# Patient Record
Sex: MALE | Race: BLACK OR AFRICAN AMERICAN | NOT HISPANIC OR LATINO | URBAN - METROPOLITAN AREA
[De-identification: names, ages, dates, MRNs, and addresses within clinical notes are randomized per-mention and may not be internally consistent; named-entity substitution may affect disease eponyms.]

---

## 2018-06-15 PROBLEM — Z00.00 ENCOUNTER FOR PREVENTIVE HEALTH EXAMINATION: Status: ACTIVE | Noted: 2018-06-15

## 2021-06-07 ENCOUNTER — INPATIENT (INPATIENT)
Facility: HOSPITAL | Age: 71
LOS: 2 days | Discharge: HOME | End: 2021-06-10
Attending: INTERNAL MEDICINE | Admitting: INTERNAL MEDICINE
Payer: COMMERCIAL

## 2021-06-07 VITALS
HEART RATE: 65 BPM | SYSTOLIC BLOOD PRESSURE: 166 MMHG | OXYGEN SATURATION: 97 % | WEIGHT: 198.86 LBS | TEMPERATURE: 99 F | RESPIRATION RATE: 16 BRPM | DIASTOLIC BLOOD PRESSURE: 66 MMHG

## 2021-06-07 PROCEDURE — 99285 EMERGENCY DEPT VISIT HI MDM: CPT

## 2021-06-07 NOTE — ED ADULT TRIAGE NOTE - CHIEF COMPLAINT QUOTE
c/o midsternal chest pain starting yesterday and high blood pressure, BP as documented, denies headache, SOB, vision changes. Hx of HTN and DMT2, Fs as noted.

## 2021-06-07 NOTE — ED ADULT NURSE NOTE - OBJECTIVE STATEMENT
received pt to room 6 aox3 in no apparent distress VSS c/o chest pain since this morning. Pt endorses diaphoresis upon waking and one 1 episode n/v. Pt also states BP has been high all day 170s/100s, has not yet taken home medications. Pt denies back pain, numbness, weakness, SOB. Describes pain as tightness. Pt noted to be hypertensive, well appearing NSR on cardiac monitor awaiting MD multani will continue to monitor

## 2021-06-08 DIAGNOSIS — Z29.9 ENCOUNTER FOR PROPHYLACTIC MEASURES, UNSPECIFIED: ICD-10-CM

## 2021-06-08 DIAGNOSIS — R07.9 CHEST PAIN, UNSPECIFIED: ICD-10-CM

## 2021-06-08 DIAGNOSIS — I21.4 NON-ST ELEVATION (NSTEMI) MYOCARDIAL INFARCTION: ICD-10-CM

## 2021-06-08 DIAGNOSIS — I10 ESSENTIAL (PRIMARY) HYPERTENSION: ICD-10-CM

## 2021-06-08 DIAGNOSIS — E11.9 TYPE 2 DIABETES MELLITUS WITHOUT COMPLICATIONS: ICD-10-CM

## 2021-06-08 LAB
A1C WITH ESTIMATED AVERAGE GLUCOSE RESULT: 9.1 % — HIGH (ref 4–5.6)
ALBUMIN SERPL ELPH-MCNC: 4 G/DL — SIGNIFICANT CHANGE UP (ref 3.3–5)
ALBUMIN SERPL ELPH-MCNC: 4.1 G/DL — SIGNIFICANT CHANGE UP (ref 3.3–5)
ALP SERPL-CCNC: 75 U/L — SIGNIFICANT CHANGE UP (ref 40–120)
ALP SERPL-CCNC: 75 U/L — SIGNIFICANT CHANGE UP (ref 40–120)
ALT FLD-CCNC: 36 U/L — SIGNIFICANT CHANGE UP (ref 4–41)
ALT FLD-CCNC: 38 U/L — SIGNIFICANT CHANGE UP (ref 4–41)
ANION GAP SERPL CALC-SCNC: 10 MMOL/L — SIGNIFICANT CHANGE UP (ref 7–14)
ANION GAP SERPL CALC-SCNC: 12 MMOL/L — SIGNIFICANT CHANGE UP (ref 7–14)
APTT BLD: 28.2 SEC — SIGNIFICANT CHANGE UP (ref 27–36.3)
APTT BLD: 72.2 SEC — HIGH (ref 27–36.3)
AST SERPL-CCNC: 41 U/L — HIGH (ref 4–40)
AST SERPL-CCNC: 47 U/L — HIGH (ref 4–40)
BILIRUB SERPL-MCNC: 0.5 MG/DL — SIGNIFICANT CHANGE UP (ref 0.2–1.2)
BILIRUB SERPL-MCNC: 0.7 MG/DL — SIGNIFICANT CHANGE UP (ref 0.2–1.2)
BUN SERPL-MCNC: 13 MG/DL — SIGNIFICANT CHANGE UP (ref 7–23)
BUN SERPL-MCNC: 15 MG/DL — SIGNIFICANT CHANGE UP (ref 7–23)
CALCIUM SERPL-MCNC: 9.2 MG/DL — SIGNIFICANT CHANGE UP (ref 8.4–10.5)
CALCIUM SERPL-MCNC: 9.5 MG/DL — SIGNIFICANT CHANGE UP (ref 8.4–10.5)
CHLORIDE SERPL-SCNC: 102 MMOL/L — SIGNIFICANT CHANGE UP (ref 98–107)
CHLORIDE SERPL-SCNC: 105 MMOL/L — SIGNIFICANT CHANGE UP (ref 98–107)
CHOLEST SERPL-MCNC: 176 MG/DL — SIGNIFICANT CHANGE UP
CK MB BLD-MCNC: 2.3 % — SIGNIFICANT CHANGE UP (ref 0–2.5)
CK MB CFR SERPL CALC: 14.4 NG/ML — HIGH
CK SERPL-CCNC: 636 U/L — HIGH (ref 30–200)
CO2 SERPL-SCNC: 23 MMOL/L — SIGNIFICANT CHANGE UP (ref 22–31)
CO2 SERPL-SCNC: 26 MMOL/L — SIGNIFICANT CHANGE UP (ref 22–31)
CREAT SERPL-MCNC: 1.2 MG/DL — SIGNIFICANT CHANGE UP (ref 0.5–1.3)
CREAT SERPL-MCNC: 1.2 MG/DL — SIGNIFICANT CHANGE UP (ref 0.5–1.3)
ESTIMATED AVERAGE GLUCOSE: 214 MG/DL — HIGH (ref 68–114)
GLUCOSE BLDC GLUCOMTR-MCNC: 112 MG/DL — HIGH (ref 70–99)
GLUCOSE BLDC GLUCOMTR-MCNC: 168 MG/DL — HIGH (ref 70–99)
GLUCOSE BLDC GLUCOMTR-MCNC: 188 MG/DL — HIGH (ref 70–99)
GLUCOSE BLDC GLUCOMTR-MCNC: 190 MG/DL — HIGH (ref 70–99)
GLUCOSE SERPL-MCNC: 207 MG/DL — HIGH (ref 70–99)
GLUCOSE SERPL-MCNC: 226 MG/DL — HIGH (ref 70–99)
HCT VFR BLD CALC: 38 % — LOW (ref 39–50)
HCT VFR BLD CALC: 39.2 % — SIGNIFICANT CHANGE UP (ref 39–50)
HDLC SERPL-MCNC: 51 MG/DL — SIGNIFICANT CHANGE UP
HGB BLD-MCNC: 12.3 G/DL — LOW (ref 13–17)
HGB BLD-MCNC: 12.8 G/DL — LOW (ref 13–17)
INR BLD: 1.11 RATIO — SIGNIFICANT CHANGE UP (ref 0.88–1.16)
LIPID PNL WITH DIRECT LDL SERPL: 106 MG/DL — HIGH
MAGNESIUM SERPL-MCNC: 2 MG/DL — SIGNIFICANT CHANGE UP (ref 1.6–2.6)
MCHC RBC-ENTMCNC: 30.4 PG — SIGNIFICANT CHANGE UP (ref 27–34)
MCHC RBC-ENTMCNC: 30.5 PG — SIGNIFICANT CHANGE UP (ref 27–34)
MCHC RBC-ENTMCNC: 32.4 GM/DL — SIGNIFICANT CHANGE UP (ref 32–36)
MCHC RBC-ENTMCNC: 32.7 GM/DL — SIGNIFICANT CHANGE UP (ref 32–36)
MCV RBC AUTO: 93.1 FL — SIGNIFICANT CHANGE UP (ref 80–100)
MCV RBC AUTO: 94.3 FL — SIGNIFICANT CHANGE UP (ref 80–100)
NON HDL CHOLESTEROL: 125 MG/DL — SIGNIFICANT CHANGE UP
NRBC # BLD: 0 /100 WBCS — SIGNIFICANT CHANGE UP
NRBC # BLD: 0 /100 WBCS — SIGNIFICANT CHANGE UP
NRBC # FLD: 0 K/UL — SIGNIFICANT CHANGE UP
NRBC # FLD: 0 K/UL — SIGNIFICANT CHANGE UP
NT-PROBNP SERPL-SCNC: 156 PG/ML — SIGNIFICANT CHANGE UP
PHOSPHATE SERPL-MCNC: 2.3 MG/DL — LOW (ref 2.5–4.5)
PLATELET # BLD AUTO: 132 K/UL — LOW (ref 150–400)
PLATELET # BLD AUTO: 133 K/UL — LOW (ref 150–400)
POTASSIUM SERPL-MCNC: 4 MMOL/L — SIGNIFICANT CHANGE UP (ref 3.5–5.3)
POTASSIUM SERPL-MCNC: 4.1 MMOL/L — SIGNIFICANT CHANGE UP (ref 3.5–5.3)
POTASSIUM SERPL-SCNC: 4 MMOL/L — SIGNIFICANT CHANGE UP (ref 3.5–5.3)
POTASSIUM SERPL-SCNC: 4.1 MMOL/L — SIGNIFICANT CHANGE UP (ref 3.5–5.3)
PROT SERPL-MCNC: 6.8 G/DL — SIGNIFICANT CHANGE UP (ref 6–8.3)
PROT SERPL-MCNC: 7 G/DL — SIGNIFICANT CHANGE UP (ref 6–8.3)
PROTHROM AB SERPL-ACNC: 12.6 SEC — SIGNIFICANT CHANGE UP (ref 10.6–13.6)
RBC # BLD: 4.03 M/UL — LOW (ref 4.2–5.8)
RBC # BLD: 4.21 M/UL — SIGNIFICANT CHANGE UP (ref 4.2–5.8)
RBC # FLD: 12.2 % — SIGNIFICANT CHANGE UP (ref 10.3–14.5)
RBC # FLD: 12.6 % — SIGNIFICANT CHANGE UP (ref 10.3–14.5)
SARS-COV-2 RNA SPEC QL NAA+PROBE: SIGNIFICANT CHANGE UP
SODIUM SERPL-SCNC: 137 MMOL/L — SIGNIFICANT CHANGE UP (ref 135–145)
SODIUM SERPL-SCNC: 141 MMOL/L — SIGNIFICANT CHANGE UP (ref 135–145)
TRIGL SERPL-MCNC: 93 MG/DL — SIGNIFICANT CHANGE UP
TROPONIN T, HIGH SENSITIVITY RESULT: 422 NG/L — CRITICAL HIGH
TROPONIN T, HIGH SENSITIVITY RESULT: 465 NG/L — CRITICAL HIGH
TROPONIN T, HIGH SENSITIVITY RESULT: 562 NG/L — CRITICAL HIGH
TSH SERPL-MCNC: 0.66 UIU/ML — SIGNIFICANT CHANGE UP (ref 0.27–4.2)
WBC # BLD: 5.23 K/UL — SIGNIFICANT CHANGE UP (ref 3.8–10.5)
WBC # BLD: 5.99 K/UL — SIGNIFICANT CHANGE UP (ref 3.8–10.5)
WBC # FLD AUTO: 5.23 K/UL — SIGNIFICANT CHANGE UP (ref 3.8–10.5)
WBC # FLD AUTO: 5.99 K/UL — SIGNIFICANT CHANGE UP (ref 3.8–10.5)

## 2021-06-08 PROCEDURE — 92941 PRQ TRLML REVSC TOT OCCL AMI: CPT | Mod: RC

## 2021-06-08 PROCEDURE — 99223 1ST HOSP IP/OBS HIGH 75: CPT

## 2021-06-08 PROCEDURE — 71045 X-RAY EXAM CHEST 1 VIEW: CPT | Mod: 26

## 2021-06-08 PROCEDURE — 93458 L HRT ARTERY/VENTRICLE ANGIO: CPT | Mod: 26,59

## 2021-06-08 RX ORDER — NITROGLYCERIN 6.5 MG
0.4 CAPSULE, EXTENDED RELEASE ORAL
Refills: 0 | Status: DISCONTINUED | OUTPATIENT
Start: 2021-06-08 | End: 2021-06-10

## 2021-06-08 RX ORDER — ASPIRIN/CALCIUM CARB/MAGNESIUM 324 MG
243 TABLET ORAL ONCE
Refills: 0 | Status: COMPLETED | OUTPATIENT
Start: 2021-06-08 | End: 2021-06-08

## 2021-06-08 RX ORDER — ATORVASTATIN CALCIUM 80 MG/1
80 TABLET, FILM COATED ORAL AT BEDTIME
Refills: 0 | Status: DISCONTINUED | OUTPATIENT
Start: 2021-06-09 | End: 2021-06-10

## 2021-06-08 RX ORDER — DEXTROSE 50 % IN WATER 50 %
12.5 SYRINGE (ML) INTRAVENOUS ONCE
Refills: 0 | Status: DISCONTINUED | OUTPATIENT
Start: 2021-06-08 | End: 2021-06-10

## 2021-06-08 RX ORDER — METOPROLOL TARTRATE 50 MG
25 TABLET ORAL
Refills: 0 | Status: DISCONTINUED | OUTPATIENT
Start: 2021-06-08 | End: 2021-06-08

## 2021-06-08 RX ORDER — SODIUM CHLORIDE 9 MG/ML
1000 INJECTION INTRAMUSCULAR; INTRAVENOUS; SUBCUTANEOUS
Refills: 0 | Status: DISCONTINUED | OUTPATIENT
Start: 2021-06-08 | End: 2021-06-10

## 2021-06-08 RX ORDER — DEXTROSE 50 % IN WATER 50 %
15 SYRINGE (ML) INTRAVENOUS ONCE
Refills: 0 | Status: DISCONTINUED | OUTPATIENT
Start: 2021-06-08 | End: 2021-06-10

## 2021-06-08 RX ORDER — GLUCAGON INJECTION, SOLUTION 0.5 MG/.1ML
1 INJECTION, SOLUTION SUBCUTANEOUS ONCE
Refills: 0 | Status: DISCONTINUED | OUTPATIENT
Start: 2021-06-08 | End: 2021-06-10

## 2021-06-08 RX ORDER — DEXTROSE 50 % IN WATER 50 %
25 SYRINGE (ML) INTRAVENOUS ONCE
Refills: 0 | Status: DISCONTINUED | OUTPATIENT
Start: 2021-06-08 | End: 2021-06-10

## 2021-06-08 RX ORDER — HEPARIN SODIUM 5000 [USP'U]/ML
5000 INJECTION INTRAVENOUS; SUBCUTANEOUS ONCE
Refills: 0 | Status: COMPLETED | OUTPATIENT
Start: 2021-06-08 | End: 2021-06-08

## 2021-06-08 RX ORDER — HYDRALAZINE HCL 50 MG
25 TABLET ORAL EVERY 8 HOURS
Refills: 0 | Status: DISCONTINUED | OUTPATIENT
Start: 2021-06-08 | End: 2021-06-09

## 2021-06-08 RX ORDER — ATORVASTATIN CALCIUM 80 MG/1
80 TABLET, FILM COATED ORAL ONCE
Refills: 0 | Status: COMPLETED | OUTPATIENT
Start: 2021-06-08 | End: 2021-06-08

## 2021-06-08 RX ORDER — CLOPIDOGREL BISULFATE 75 MG/1
75 TABLET, FILM COATED ORAL DAILY
Refills: 0 | Status: DISCONTINUED | OUTPATIENT
Start: 2021-06-09 | End: 2021-06-10

## 2021-06-08 RX ORDER — HEPARIN SODIUM 5000 [USP'U]/ML
INJECTION INTRAVENOUS; SUBCUTANEOUS
Qty: 25000 | Refills: 0 | Status: DISCONTINUED | OUTPATIENT
Start: 2021-06-08 | End: 2021-06-08

## 2021-06-08 RX ORDER — SODIUM,POTASSIUM PHOSPHATES 278-250MG
1 POWDER IN PACKET (EA) ORAL ONCE
Refills: 0 | Status: COMPLETED | OUTPATIENT
Start: 2021-06-08 | End: 2021-06-08

## 2021-06-08 RX ORDER — SODIUM CHLORIDE 9 MG/ML
1000 INJECTION, SOLUTION INTRAVENOUS
Refills: 0 | Status: DISCONTINUED | OUTPATIENT
Start: 2021-06-08 | End: 2021-06-10

## 2021-06-08 RX ORDER — ATORVASTATIN CALCIUM 80 MG/1
80 TABLET, FILM COATED ORAL AT BEDTIME
Refills: 0 | Status: DISCONTINUED | OUTPATIENT
Start: 2021-06-08 | End: 2021-06-08

## 2021-06-08 RX ORDER — ASPIRIN/CALCIUM CARB/MAGNESIUM 324 MG
81 TABLET ORAL DAILY
Refills: 0 | Status: DISCONTINUED | OUTPATIENT
Start: 2021-06-09 | End: 2021-06-10

## 2021-06-08 RX ORDER — INSULIN LISPRO 100/ML
VIAL (ML) SUBCUTANEOUS EVERY 6 HOURS
Refills: 0 | Status: DISCONTINUED | OUTPATIENT
Start: 2021-06-08 | End: 2021-06-08

## 2021-06-08 RX ORDER — HEPARIN SODIUM 5000 [USP'U]/ML
5300 INJECTION INTRAVENOUS; SUBCUTANEOUS EVERY 6 HOURS
Refills: 0 | Status: DISCONTINUED | OUTPATIENT
Start: 2021-06-08 | End: 2021-06-08

## 2021-06-08 RX ORDER — CLOPIDOGREL BISULFATE 75 MG/1
600 TABLET, FILM COATED ORAL ONCE
Refills: 0 | Status: COMPLETED | OUTPATIENT
Start: 2021-06-08 | End: 2021-06-08

## 2021-06-08 RX ORDER — INSULIN LISPRO 100/ML
VIAL (ML) SUBCUTANEOUS
Refills: 0 | Status: DISCONTINUED | OUTPATIENT
Start: 2021-06-08 | End: 2021-06-10

## 2021-06-08 RX ADMIN — HEPARIN SODIUM 1000 UNIT(S)/HR: 5000 INJECTION INTRAVENOUS; SUBCUTANEOUS at 02:55

## 2021-06-08 RX ADMIN — Medication 25 MILLIGRAM(S): at 09:15

## 2021-06-08 RX ADMIN — HEPARIN SODIUM 5000 UNIT(S): 5000 INJECTION INTRAVENOUS; SUBCUTANEOUS at 02:55

## 2021-06-08 RX ADMIN — Medication 1: at 18:29

## 2021-06-08 RX ADMIN — SODIUM CHLORIDE 75 MILLILITER(S): 9 INJECTION INTRAMUSCULAR; INTRAVENOUS; SUBCUTANEOUS at 14:22

## 2021-06-08 RX ADMIN — CLOPIDOGREL BISULFATE 600 MILLIGRAM(S): 75 TABLET, FILM COATED ORAL at 04:20

## 2021-06-08 RX ADMIN — HEPARIN SODIUM 1000 UNIT(S)/HR: 5000 INJECTION INTRAVENOUS; SUBCUTANEOUS at 10:03

## 2021-06-08 RX ADMIN — Medication 1 PACKET(S): at 15:02

## 2021-06-08 RX ADMIN — Medication 25 MILLIGRAM(S): at 18:29

## 2021-06-08 RX ADMIN — ATORVASTATIN CALCIUM 80 MILLIGRAM(S): 80 TABLET, FILM COATED ORAL at 09:17

## 2021-06-08 RX ADMIN — Medication 243 MILLIGRAM(S): at 04:20

## 2021-06-08 NOTE — H&P ADULT - PROBLEM SELECTOR PLAN 4
- DVT ppx - on heparin gtt for ACS  - Case and plan to be discussed with attending - DVT ppx - on heparin gtt for ACS  - Case and plan to be discussed with Dr. Coronel

## 2021-06-08 NOTE — H&P ADULT - PROBLEM SELECTOR PLAN 3
- Check HgA1c  - hold metformin while inpatient  - check FS q6h while NPO  - ISS ordered for coverage

## 2021-06-08 NOTE — H&P ADULT - NSICDXFAMILYHX_GEN_ALL_CORE_FT
FAMILY HISTORY:  Mother  Still living? Unknown  Maternal family history of hypertension, Age at diagnosis: Age Unknown

## 2021-06-08 NOTE — ED PROVIDER NOTE - CLINICAL SUMMARY MEDICAL DECISION MAKING FREE TEXT BOX
71 M with a pmhx of HTN, DM presents to the ED with chest pain that started acutely this morning. Since this morning, he has had multiple episode of chest pain since this morning. He has no identifiable triggers for his chest pain. vitals stable. PE is obese male, no acute distress. rest of exam is benign. r/o acs. has multiple risk factors. will order labs, imaging, reasess

## 2021-06-08 NOTE — H&P ADULT - NEGATIVE NEUROLOGICAL SYMPTOMS
no transient paralysis/no weakness/no paresthesias/no generalized seizures/no syncope/no loss of sensation/no difficulty walking/no headache

## 2021-06-08 NOTE — CONSULT NOTE ADULT - PROBLEM SELECTOR RECOMMENDATION 3
Hold Metformin for possible Angiogram today  Consider Insulin sliding scale with AC and HS glucose checks  HgA1C

## 2021-06-08 NOTE — CONSULT NOTE ADULT - PROBLEM SELECTOR RECOMMENDATION 9
Continue Heparin gtt ACS Protocol  Plavix 600mg orally Stat  Nitroglycerin SL q5m X 3 for chest pain  Change home dose Metoprolol ER 25mg to Metoprolol Tartrate 25mg BID  Continue Troponins, CK, CK-MB q6-8hrs X 3, Lipid Panel,   EKG q8h and PRN for chest pain  NPO for possible Angiogram 6/8  TTE to evaluate left ventricular function today Continue Heparin gtt ACS Protocol  Plavix 600mg orally Stat  Nitroglycerin SL q5m X 3 for chest pain  Change home dose Metoprolol ER 25mg to Metoprolol Tartrate 25mg BID  Continue Troponins, CK, CK-MB q6-8hrs X 3, Lipid Panel,   EKG q8h and PRN for chest pain  NPO for possible Angiogram 6/8  TTE to evaluate left ventricular function today  Please obtain medical records from Dr. Spaulding, [phone]

## 2021-06-08 NOTE — H&P ADULT - NSHPLABSRESULTS_GEN_ALL_CORE
12.3   5.99  )-----------( 133      ( 08 Jun 2021 01:08 )             38.0   06-08    137  |  102  |  15  ----------------------------<  226<H>  4.0   |  23  |  1.20    Ca    9.5      08 Jun 2021 01:08    TPro  7.0  /  Alb  4.0  /  TBili  0.5  /  DBili  x   /  AST  47<H>  /  ALT  38  /  AlkPhos  75  06-08    TropT 465->562    CKMB 21.7  ProBNP 156    Covid PCR neg    CXR no acute findings    ED EKG #1: NSR @ 68bpm, no ischemic changes, no prior EKG in system to compare   ED EKG #2: NSR @ 61bpm, no acute ischemic changes, unchanged from first

## 2021-06-08 NOTE — ED ADULT NURSE REASSESSMENT NOTE - NS ED NURSE REASSESS COMMENT FT1
heparin infusion initiated, pt NSR on cardiac monitor, 2 large bore IVs in placed. Pt educated on signs/symptoms of bleeding. Infusion and bolus verified by 2nd RN bedside. Will continue to monitor

## 2021-06-08 NOTE — H&P ADULT - PROBLEM SELECTOR PLAN 2
- Uncontrolled in setting of missing home medications (last doses were 6/6 per patient)  - Per my discussion with cards, hold olmesartan/HCTZ with plan for angiogram  - start on hydralazine 25mg PO TID with hold parameters as above  - changed metoprolol succinate 25mg daily to Lopressor 25mg PO BID as above   - Monitor vital signs q4h

## 2021-06-08 NOTE — H&P ADULT - ATTENDING COMMENTS
Pt seen in CDU at 9:45AM, sitting in bed, denies chest pain since admission. Pending [facility] today  Overall, 71M w/T2DM, HTN admitted with chest pain found to have NSTEMI, pending cath today.  #CP: s/p ASA & Plavix load, on hep gtt, pending cath today  Trops/Ekg reviewed   f/u A1c, lipid, TSH   #T2DM: hold metformin, check A1c, c/w ISS.  #HTN: uncontrolled, BP above goal, hold ACE/Arb, c/w hydral 25mg TID for now, uptitrate as needed  d/w ACP Pt seen in CDU at 9:45AM, sitting in bed, denies chest pain since admission. Pending [facility] today  Overall, 71M w/T2DM, HTN admitted with chest pain found to have NSTEMI, pending cath today.  #CP: s/p ASA & Plavix load, on hep gtt, pending cath today  Trops/Ekg reviewed   f/u A1c, lipid, TSH   #T2DM: hold metformin, check A1c, c/w ISS.  #HTN: uncontrolled, BP above goal, hold ACE/Arb, c/w hydral 25mg TID for now, uptitrate as needed  d/w ACP  Called PCP again, no answer, unable to leave message

## 2021-06-08 NOTE — PROGRESS NOTE ADULT - ASSESSMENT
70 yo M with HTN, HLD, T2DM p/w retrosternal cp from PMD office yesterday. Uptrending troponin and CKMB.    Bedside POCUS with inferoseptum hypokinesis. Severe LV hypertrophy and calcified aortic valve     -ASA/Plavix load and heparin gtt   -would hold beta blocker given possible inferior wall involvement   -hydalazine for bp control   -hold ARB and ACE-i pre cath   -cont high intensity statin   -f/u formal TTE   -for [facility] today

## 2021-06-08 NOTE — H&P ADULT - NSHPSOCIALHISTORY_GEN_ALL_CORE
Lives at home with wife, independent of ADLs    Admits occasional alcohol usage  denies drugs or tobacco    Works as  for [employer]

## 2021-06-08 NOTE — H&P ADULT - PROBLEM SELECTOR PLAN 1
- Elevated Trops 465->562, ck 859, CKMB 21.7 consistent with NSTEMI  - EKG x 2 NSR with no acute ischemic changes noted   - Cardiology evaluation appreciated  - Keep NPO for possible angiogram today  - s/p plavix 600mg in ED - cont plavix 75mg daily thereafter   - s/p  in ED - cont asa 81mg daily thereafter   - changed metoprolol succinate ER 25mg daily to Lopressor 25mg BID per cards  - BP control - hold ARB per my discussion with cards fellow this AM - start hydralazine 25mg PO TID   - Serial EKG q8h, trend cardiac enzymes q8h x 3 sets  - check lipid panel, started on atorvastatin 80mg HS per cards   - Nitro SL q5min x 3 doses PRN for chest pain if needed  - TTE to evaluate left ventricular function  - Keep O2% > 90% with O2 via nasal cannula if required   - Keep K > 4, Mg > 2  - attempted to call Dr. Ramirez office at [phone] today to request records however office closed currently

## 2021-06-08 NOTE — H&P ADULT - NEGATIVE ENMT SYMPTOMS
no hearing difficulty/no ear pain/no tinnitus/no vertigo/no nasal congestion/no nasal discharge/no throat pain

## 2021-06-08 NOTE — CONSULT NOTE ADULT - SUBJECTIVE AND OBJECTIVE BOX
This is a 71 year old man with past medical history of Type 2 Diabetes mellitus, Hypertension presented to [facility] ED for intermittent chest pain since 6/7/21 3:30 am while preparing to go to work.  Chest pain described as sharp pain, non-radiating pain localized to midsternum that worsens with activity and resolves with rest.  He contacted his private doctor, Dr. Spaulding in [place], [place], who instructed him to take aspirin 81 mg and come to the ED.  In ED EKG was NSR with no ischemic changes, resulting Cardiac Enzymes show Troponin 465 uptrending to 562,  with CK-MB 21.7.  Patient seen resting comfortably in bed on Heparin gtt ACS protocol, states he is currently not having chest pain and has not had chest pain since 8pm. Patietn on telemetry showing SR 60-70 with no ectopy, BPs 160s/70s to 179/89. Patient denies recent stressors, previous chest pain, fever, cough, chills N/V/D.      This is a 71 year old man with past medical history of Type 2 Diabetes mellitus, Hypertension presented to [facility] ED for intermittent chest pain since 6/7/21 3:30 am while preparing to go to work.  Chest pain described as sharp pain, non-radiating pain localized to midsternum that worsens with activity and resolves with rest.  He contacted his private doctor, Dr. Spaulding in [place], [place], who instructed him to take aspirin 81 mg and come to the ED.  In ED EKG was NSR with no ischemic changes, resulting Cardiac Enzymes show Troponin 465 uptrending to 562,  with CK-MB 21.7.  Patient seen resting comfortably in bed on Heparin gtt ACS protocol, states he is currently not having chest pain and has not had chest pain since 8pm. Patietn on telemetry showing SR 60-70 with no ectopy, BPs 160s/70s to 179/89. Patient denies recent stressors, previous chest pain, fever, cough, chills N/V/D.      PHYSICAL EXAM:      Constitutional: Resting Comfortably, Alert    Eyes: PERRL    ENMT: normal oral mucosa    Neck: No JVD, No cervical lymphadenopathy    Respiratory: Clear in all fields    Cardiovascular: S1S2 RRR, no chest pain    Gastrointestinal: mild distension, soft, no organomegaly    Extremities: No lower extremity peripheral edema    Vascular: +2 pedal pulses    Neurological: A+OX3      Home medications:  Olmesartan/HCTZ 40mg/12.5mg daily  Metoprolol ER 25mg daily  Metformin 500mg BID    Patient denies ETOH abuse/Smoking  Family History of Hypertension - Mother        This is a 71 year old man with past medical history of Type 2 Diabetes mellitus, Hypertension presented to [facility] ED for intermittent chest pain since 6/7/21 3:30 am while preparing to go to work.  Chest pain described as sharp pain, non-radiating pain localized to midsternum that worsens with activity and resolves with rest.  He contacted his private doctor, Dr. Spaulding in [place], [place], who instructed him to take aspirin 81 mg and come to the ED.  In ED EKG was NSR with no ischemic changes, resulting Cardiac Enzymes show Troponin 465 uptrending to 562,  with CK-MB 21.7.  Patient seen resting comfortably in bed on Heparin gtt ACS protocol, states he is currently not having chest pain and has not had chest pain since 8pm. Patietn on telemetry showing SR 60-70 with no ectopy, BPs 160s/70s to 179/89. Patient denies recent stressors, previous chest pain, fever, cough, chills N/V/D.      PHYSICAL EXAM:      Constitutional: Resting Comfortably, Alert    Eyes: PERRL    ENMT: normal oral mucosa    Neck: No JVD, No cervical lymphadenopathy    Respiratory: Clear in all fields    Cardiovascular: S1S2 RRR, no chest pain    Gastrointestinal: mild distension, soft, no organomegaly    Extremities: No lower extremity peripheral edema    Vascular: +2 pedal pulses    Neurological: A+OX3      Home medications:  Olmesartan/HCTZ 40mg/12.5mg daily  Metoprolol ER 25mg daily  Metformin 500mg BID    Patient denies ETOH abuse/Smoking  Family History of Hypertension - Mother            This is a 71 year old man with past medical history of Type 2 Diabetes mellitus, Hypertension presented to [facility] ED for intermittent chest pain since 6/7/21 3:30 am while preparing to go to work.  Chest pain described as sharp pain, non-radiating pain localized to midsternum that worsens with activity and resolves with rest.  He contacted his private doctor, Dr. Spaulding in [place], [place], who instructed him to take aspirin 81 mg and come to the ED.  In ED EKG was NSR with no ischemic changes, resulting Cardiac Enzymes show Troponin 465 uptrending to 562,  with CK-MB 21.7.  Patient seen resting comfortably in bed on Heparin gtt ACS protocol, states he is currently not having chest pain and has not had chest pain since 8pm. Patietn on telemetry showing SR 60-70 with no ectopy, BPs 160s/70s to 179/89. Patient denies recent stressors, previous chest pain, fever, cough, chills N/V/D.      PHYSICAL EXAM:      Constitutional: Resting Comfortably, Alert    Eyes: PERRL    ENMT: normal oral mucosa    Neck: No JVD, No cervical lymphadenopathy    Respiratory: Clear in all fields    Cardiovascular: S1S2 RRR, no chest pain    Gastrointestinal: mild distension, soft, no organomegaly    Extremities: No lower extremity peripheral edema    Vascular: +2 pedal pulses    Neurological: A+OX3      Home medications:  Olmesartan/HCTZ 40mg/12.5mg daily  Metoprolol ER 25mg daily  Metformin 500mg BID    Patient denies ETOH abuse/Smoking  Family History of Hypertension - Mother              This is a 71 year old man with past medical history of Type 2 Diabetes mellitus, Hypertension presented to [facility] ED for intermittent chest pain since 6/7/21 3:30 am while preparing to go to work.  Chest pain described as sharp pain, non-radiating pain localized to midsternum that worsens with activity and resolves with rest.  He contacted his private doctor, Dr. Spaulding in [place], [place], who instructed him to take aspirin 81 mg and come to the ED.  In ED EKG was NSR with no ischemic changes, resulting Cardiac Enzymes show Troponin 465 uptrending to 562,  with CK-MB 21.7.  Patient seen resting comfortably in bed on Heparin gtt ACS protocol, states he is currently not having chest pain and has not had chest pain since 8pm. Patietn on telemetry showing SR 60-70 with no ectopy, BPs 160s/70s to 179/89. Patient denies recent stressors, previous chest pain, fever, cough, chills N/V/D.      PHYSICAL EXAM:      Constitutional: Resting Comfortably, Alert    Eyes: PERRL    ENMT: normal oral mucosa    Neck: No JVD, No cervical lymphadenopathy    Respiratory: Clear in all fields    Cardiovascular: S1S2 RRR, no chest pain    Gastrointestinal: mild distension, soft, no organomegaly    Extremities: No lower extremity peripheral edema    Vascular: +2 pedal pulses    Neurological: A+OX3      Home medications:  Olmesartan/HCTZ 40mg/12.5mg daily  Metoprolol ER 25mg daily  Metformin 500mg BID    Patient denies ETOH abuse/Smoking  Family History of Hypertension - Mother    HISTORY OF PRESENT ILLNESS:      Allergies    No Known Allergies    Intolerances	    MEDICATIONS:  heparin   Injectable 5300 Unit(s) IV Push every 6 hours PRN  heparin  Infusion.  Unit(s)/Hr IV Continuous <Continuous>    PAST MEDICAL & SURGICAL HISTORY:  Hypertension, unspecified type      EVIEW OF SYSTEMS:  See HPI, otherwise complete 10 point review of systems negative    PHYSICAL EXAM:  T(C): 37.2 (06-08-21 @ 02:22), Max: 37.3 (06-07-21 @ 21:59)  HR: 60 (06-08-21 @ 02:22) (60 - 75)  BP: 170/87 (06-08-21 @ 02:22) (166/66 - 194/88)  RR: 16 (06-08-21 @ 02:22) (16 - 18)  SpO2: 100% (06-08-21 @ 02:22) (97% - 100%)  Wt(kg): --  I&O's Summary    LABS:	 	    CBC Full  -  ( 08 Jun 2021 01:08 )  WBC Count : 5.99 K/uL  Hemoglobin : 12.3 g/dL  Hematocrit : 38.0 %  Platelet Count - Automated : 133 K/uL  Mean Cell Volume : 94.3 fL  Mean Cell Hemoglobin : 30.5 pg  Mean Cell Hemoglobin Concentration : 32.4 gm/dL  Auto Neutrophil # : x  Auto Lymphocyte # : x  Auto Monocyte # : x  Auto Eosinophil # : x  Auto Basophil # : x  Auto Neutrophil % : x  Auto Lymphocyte % : x  Auto Monocyte % : x  Auto Eosinophil % : x  Auto Basophil % : x    06-08    137  |  102  |  15  ----------------------------<  226<H>  4.0   |  23  |  1.20    Ca    9.5      08 Jun 2021 01:08    TPro  7.0  /  Alb  4.0  /  TBili  0.5  /  DBili  x   /  AST  47<H>  /  ALT  38  /  AlkPhos  75  06-08      proBNP: Serum Pro-Brain Natriuretic Peptide: 156 pg/mL (06-08 @ 01:08)    Lipid Profile:   HgA1c:   TSH:       CARDIAC MARKERS:  Trop 465 -> 562    CK-MB 21.7            TELEMETRY: 	SR 60-70s    ECG:  	NSR  RADIOLOGY: Chest X-Ray No pulmonary edema or cardiomegaly  OTHER:

## 2021-06-08 NOTE — CONSULT NOTE ADULT - PROBLEM SELECTOR RECOMMENDATION 2
May give Olmesartan 40mg X 1 dose for BP control May give Olmesartan 40mg X 1 dose now for BP control  Consider standing oral Hydralazine 25mg q6h until post angiogram - hold for SBP < 100mmHg

## 2021-06-08 NOTE — H&P ADULT - NEGATIVE GASTROINTESTINAL SYMPTOMS
no nausea/no diarrhea/no constipation/no change in bowel habits/no abdominal pain/no melena/no hematochezia

## 2021-06-08 NOTE — ED PROVIDER NOTE - PHYSICAL EXAMINATION
GENERAL: no acute distress, non-toxic appearing  HEAD: normocephalic, atraumatic  HEENT: normal conjunctiva, oral mucosa moist, neck supple  CARDIAC: regular rate and rhythm, normal S1 and S2,  no appreciable murmurs  PULM: clear to ascultation bilaterally, no crackles, rales, rhonchi, or wheezing  GI: abdomen nondistended, soft, nontender, no guarding or rebound tenderness  : no CVA tenderness, no suprapubic tenderness  NEURO: alert and oriented x 3, normal speech, PERRLA, EOMI, no focal motor or sensory deficits, nonantalgic gait  MSK: no visible deformities, no peripheral edema, calf tenderness/redness/swelling  SKIN: no visible rashes, dry, well-perfused  PSYCH: appropriate mood and affect

## 2021-06-08 NOTE — H&P ADULT - HISTORY OF PRESENT ILLNESS
71M PMHx DM, HTN admitted with chest pain. Patient states he was in his usual state of health when around 3:30AM on 6/7 he was getting ready for work and started to have chest pain. He describes the chest pain as 5/10 on pain scale, no alleviating or aggravating factors, sharp, non-radiating, midsternal location. He admits associated diaphoresis. He denies any other associated symptoms such as shortness of breath, palpitations, nausea, headache. His wife gave him allen tea with garlic and he did vomit that up but does not feel the vomiting was related to his chest pain. He states pain comes and goes without clear triggers and has been chest pain free since arrival to the ER. He is resting comfortably currently in no acute distress and is currently asymptomatic. He does not follow with cardiologist in the outpatient setting. He otherwise denies any recurrent chest pain, abdominal pain, changes in bowel habits, constipation, diarrhea, changes in vision or hearing, focal weakness or problems with urination. In the ED he was started on heparin gtt, given plavix 600mg and ASA 262mg and evaluate by cardiology. He was also noted to be hypertensive with uncontrolled BP. EKG was NSR without ischemic changes x 2.

## 2021-06-08 NOTE — CONSULT NOTE ADULT - ATTENDING COMMENTS
Personally saw and examined patient  Labs and vitals reviewed  Agree with above assessment and plan  71M with DM and HTN p/w CP, NSTEMI  s/p PCI RCA, now feeling well, denies cp  cont DAPT, statin  will plan to start BB therapy  TTE pending  will follow

## 2021-06-08 NOTE — H&P ADULT - MUSCULOSKELETAL
normal/ROM intact/no joint swelling/no joint erythema/no joint warmth/normal strength details… detailed exam

## 2021-06-08 NOTE — ED ADULT NURSE REASSESSMENT NOTE - NS ED NURSE REASSESS COMMENT FT1
pt noted with elevated trops. 2nd PIV 18 G L AC placed, labs sent, rpt EKG completed, NSR on cardiac monitor will initiate heparin infusion

## 2021-06-08 NOTE — ED PROVIDER NOTE - OBJECTIVE STATEMENT
71 M with a pmhx of HTN, DM presents to the ED with chest pain that started acutely this morning. Since this morning, he has had multiple episode of chest pain since this morning. He has no identifiable triggers for his chest pain. He does not follow with a cardiologist. He chest pain is localized over his anterior chest and does not radiate anywhere else. He denies any fevers, chills, N/V/D. He denies any other symptoms at bedside.

## 2021-06-08 NOTE — ED ADULT NURSE REASSESSMENT NOTE - NS ED NURSE REASSESS COMMENT FT1
CANDICE dong to transport pt to CDU, report given to Nicola pt transported denying complaints VSS, NSR on cardiac monitor, AOX3, in no apparent distress

## 2021-06-09 ENCOUNTER — TRANSCRIPTION ENCOUNTER (OUTPATIENT)
Age: 71
End: 2021-06-09

## 2021-06-09 DIAGNOSIS — E11.65 TYPE 2 DIABETES MELLITUS WITH HYPERGLYCEMIA: ICD-10-CM

## 2021-06-09 DIAGNOSIS — I10 ESSENTIAL (PRIMARY) HYPERTENSION: ICD-10-CM

## 2021-06-09 DIAGNOSIS — E78.5 HYPERLIPIDEMIA, UNSPECIFIED: ICD-10-CM

## 2021-06-09 LAB
ALBUMIN SERPL ELPH-MCNC: 3.6 G/DL — SIGNIFICANT CHANGE UP (ref 3.3–5)
ALP SERPL-CCNC: 78 U/L — SIGNIFICANT CHANGE UP (ref 40–120)
ALT FLD-CCNC: 29 U/L — SIGNIFICANT CHANGE UP (ref 4–41)
ANION GAP SERPL CALC-SCNC: 12 MMOL/L — SIGNIFICANT CHANGE UP (ref 7–14)
AST SERPL-CCNC: 29 U/L — SIGNIFICANT CHANGE UP (ref 4–40)
BILIRUB SERPL-MCNC: 0.5 MG/DL — SIGNIFICANT CHANGE UP (ref 0.2–1.2)
BUN SERPL-MCNC: 16 MG/DL — SIGNIFICANT CHANGE UP (ref 7–23)
CALCIUM SERPL-MCNC: 9.1 MG/DL — SIGNIFICANT CHANGE UP (ref 8.4–10.5)
CHLORIDE SERPL-SCNC: 105 MMOL/L — SIGNIFICANT CHANGE UP (ref 98–107)
CO2 SERPL-SCNC: 21 MMOL/L — LOW (ref 22–31)
COVID-19 SPIKE DOMAIN AB INTERP: POSITIVE
COVID-19 SPIKE DOMAIN ANTIBODY RESULT: >250 U/ML — HIGH
CREAT SERPL-MCNC: 1.26 MG/DL — SIGNIFICANT CHANGE UP (ref 0.5–1.3)
GLUCOSE BLDC GLUCOMTR-MCNC: 127 MG/DL — HIGH (ref 70–99)
GLUCOSE BLDC GLUCOMTR-MCNC: 140 MG/DL — HIGH (ref 70–99)
GLUCOSE BLDC GLUCOMTR-MCNC: 172 MG/DL — HIGH (ref 70–99)
GLUCOSE BLDC GLUCOMTR-MCNC: 198 MG/DL — HIGH (ref 70–99)
GLUCOSE SERPL-MCNC: 205 MG/DL — HIGH (ref 70–99)
HCT VFR BLD CALC: 40.2 % — SIGNIFICANT CHANGE UP (ref 39–50)
HCV AB S/CO SERPL IA: 0.33 S/CO — SIGNIFICANT CHANGE UP (ref 0–0.99)
HCV AB SERPL-IMP: SIGNIFICANT CHANGE UP
HGB BLD-MCNC: 13 G/DL — SIGNIFICANT CHANGE UP (ref 13–17)
MAGNESIUM SERPL-MCNC: 1.9 MG/DL — SIGNIFICANT CHANGE UP (ref 1.6–2.6)
MCHC RBC-ENTMCNC: 30.4 PG — SIGNIFICANT CHANGE UP (ref 27–34)
MCHC RBC-ENTMCNC: 32.3 GM/DL — SIGNIFICANT CHANGE UP (ref 32–36)
MCV RBC AUTO: 94.1 FL — SIGNIFICANT CHANGE UP (ref 80–100)
NRBC # BLD: 0 /100 WBCS — SIGNIFICANT CHANGE UP
NRBC # FLD: 0 K/UL — SIGNIFICANT CHANGE UP
PHOSPHATE SERPL-MCNC: 2.9 MG/DL — SIGNIFICANT CHANGE UP (ref 2.5–4.5)
PLATELET # BLD AUTO: 126 K/UL — LOW (ref 150–400)
POTASSIUM SERPL-MCNC: 3.7 MMOL/L — SIGNIFICANT CHANGE UP (ref 3.5–5.3)
POTASSIUM SERPL-SCNC: 3.7 MMOL/L — SIGNIFICANT CHANGE UP (ref 3.5–5.3)
PROT SERPL-MCNC: 6.6 G/DL — SIGNIFICANT CHANGE UP (ref 6–8.3)
RBC # BLD: 4.27 M/UL — SIGNIFICANT CHANGE UP (ref 4.2–5.8)
RBC # FLD: 12.6 % — SIGNIFICANT CHANGE UP (ref 10.3–14.5)
SARS-COV-2 IGG+IGM SERPL QL IA: >250 U/ML — HIGH
SARS-COV-2 IGG+IGM SERPL QL IA: POSITIVE
SODIUM SERPL-SCNC: 138 MMOL/L — SIGNIFICANT CHANGE UP (ref 135–145)
WBC # BLD: 5 K/UL — SIGNIFICANT CHANGE UP (ref 3.8–10.5)
WBC # FLD AUTO: 5 K/UL — SIGNIFICANT CHANGE UP (ref 3.8–10.5)

## 2021-06-09 PROCEDURE — 93306 TTE W/DOPPLER COMPLETE: CPT | Mod: 26

## 2021-06-09 PROCEDURE — 99223 1ST HOSP IP/OBS HIGH 75: CPT

## 2021-06-09 PROCEDURE — 99232 SBSQ HOSP IP/OBS MODERATE 35: CPT

## 2021-06-09 PROCEDURE — 99233 SBSQ HOSP IP/OBS HIGH 50: CPT

## 2021-06-09 RX ORDER — METOPROLOL TARTRATE 50 MG
1 TABLET ORAL
Qty: 30 | Refills: 0
Start: 2021-06-09 | End: 2021-07-08

## 2021-06-09 RX ORDER — METFORMIN HYDROCHLORIDE 850 MG/1
1 TABLET ORAL
Qty: 60 | Refills: 0
Start: 2021-06-09 | End: 2021-07-08

## 2021-06-09 RX ORDER — METFORMIN HYDROCHLORIDE 850 MG/1
1 TABLET ORAL
Qty: 0 | Refills: 0 | DISCHARGE

## 2021-06-09 RX ORDER — METOPROLOL TARTRATE 50 MG
25 TABLET ORAL DAILY
Refills: 0 | Status: DISCONTINUED | OUTPATIENT
Start: 2021-06-09 | End: 2021-06-10

## 2021-06-09 RX ORDER — ATORVASTATIN CALCIUM 80 MG/1
1 TABLET, FILM COATED ORAL
Qty: 30 | Refills: 0
Start: 2021-06-09 | End: 2021-07-08

## 2021-06-09 RX ORDER — CLOPIDOGREL BISULFATE 75 MG/1
1 TABLET, FILM COATED ORAL
Qty: 30 | Refills: 0
Start: 2021-06-09 | End: 2021-07-08

## 2021-06-09 RX ORDER — ISOPROPYL ALCOHOL, BENZOCAINE .7; .06 ML/ML; ML/ML
1 SWAB TOPICAL
Qty: 100 | Refills: 1
Start: 2021-06-09 | End: 2021-07-28

## 2021-06-09 RX ORDER — ASPIRIN/CALCIUM CARB/MAGNESIUM 324 MG
1 TABLET ORAL
Qty: 30 | Refills: 0
Start: 2021-06-09 | End: 2021-07-08

## 2021-06-09 RX ORDER — LOSARTAN POTASSIUM 100 MG/1
1 TABLET, FILM COATED ORAL
Qty: 30 | Refills: 0
Start: 2021-06-09 | End: 2021-07-08

## 2021-06-09 RX ORDER — LOSARTAN POTASSIUM 100 MG/1
25 TABLET, FILM COATED ORAL DAILY
Refills: 0 | Status: DISCONTINUED | OUTPATIENT
Start: 2021-06-09 | End: 2021-06-10

## 2021-06-09 RX ORDER — EMPAGLIFLOZIN 10 MG/1
1 TABLET, FILM COATED ORAL
Qty: 30 | Refills: 0
Start: 2021-06-09 | End: 2021-07-08

## 2021-06-09 RX ORDER — METOPROLOL TARTRATE 50 MG
1 TABLET ORAL
Qty: 0 | Refills: 0 | DISCHARGE

## 2021-06-09 RX ORDER — INSULIN LISPRO 100/ML
VIAL (ML) SUBCUTANEOUS AT BEDTIME
Refills: 0 | Status: DISCONTINUED | OUTPATIENT
Start: 2021-06-09 | End: 2021-06-10

## 2021-06-09 RX ORDER — OLMESARTAN MEDOXOMIL-HYDROCHLOROTHIAZIDE 25; 40 MG/1; MG/1
1 TABLET, FILM COATED ORAL
Qty: 0 | Refills: 0 | DISCHARGE

## 2021-06-09 RX ADMIN — Medication 25 MILLIGRAM(S): at 01:08

## 2021-06-09 RX ADMIN — ATORVASTATIN CALCIUM 80 MILLIGRAM(S): 80 TABLET, FILM COATED ORAL at 21:36

## 2021-06-09 RX ADMIN — LOSARTAN POTASSIUM 25 MILLIGRAM(S): 100 TABLET, FILM COATED ORAL at 10:15

## 2021-06-09 RX ADMIN — CLOPIDOGREL BISULFATE 75 MILLIGRAM(S): 75 TABLET, FILM COATED ORAL at 10:15

## 2021-06-09 RX ADMIN — Medication 1: at 09:02

## 2021-06-09 RX ADMIN — Medication 81 MILLIGRAM(S): at 10:15

## 2021-06-09 RX ADMIN — Medication 25 MILLIGRAM(S): at 10:15

## 2021-06-09 NOTE — DISCHARGE NOTE NURSING/CASE MANAGEMENT/SOCIAL WORK - NSDCFUADDAPPT_GEN_ALL_CORE_FT
Outpatient Endocrine clinic [phone] - [address], [address], [facility]

## 2021-06-09 NOTE — CONSULT NOTE ADULT - PROBLEM SELECTOR PROBLEM 1
Uncontrolled type 2 diabetes mellitus with hyperglycemia
NSTEMI (non-ST elevated myocardial infarction)

## 2021-06-09 NOTE — CONSULT NOTE ADULT - ASSESSMENT
71 year old man PMH Type 2 Diabetes mellitus, Hypertension presented to [facility] ED for intermittent chest pain since 6/7/21 early morning found to have NSTEMI currently chest pain free.
71 year old man with PMH uncontrolled DM2, HTN, HLD, admitted with chest pain, found to have NSTEMI, now s/p PCI. Consult called for management of uncontrolled DM2. HbA1c is 9.1%. High risk patient with high level decision making, at high risk of worsening microvascular and macrovascular complications. BG goal 100-180 mg/dL.

## 2021-06-09 NOTE — DISCHARGE NOTE PROVIDER - NSDCCPCAREPLAN_GEN_ALL_CORE_FT
PRINCIPAL DISCHARGE DIAGNOSIS  Diagnosis: NSTEMI (non-ST elevated myocardial infarction)  Assessment and Plan of Treatment: You were followed by the cardiology team.  You underwent a cardiac catheterization on 6/8 and 2 stents were placed.   Continue Plavix and Aspirin as prescribed.  Please schedulefollow up outpatient cardiology appointment  within 2 weeks with Dr. Tre Canela at [address] [facility] at .      SECONDARY DISCHARGE DIAGNOSES  Diagnosis: Hypertension, unspecified type  Assessment and Plan of Treatment: Continue blood pressure medication regimen as directed. Monitor for any visual changes, headaches or dizziness.  Monitor blood pressure regularly.  Follow up with your primary care provider for further management for high blood pressure.       Diagnosis: Diabetes  Assessment and Plan of Treatment: Your HgA1C during hospitalization was noted to be 9.1% (Provide such information to your primary care).  Continue your medication regimen and a consistent carbohydrate diet (Meaning eating the same amount of carbohydrates at the same time each day). Monitor blood glucose levels throughout the day before meals and at bedtime. Record blood sugars and bring to outpatient providers appointment in order to be reviewed by your doctor for management modifications. If your sugars are more than 400 or less than 70 you should contact your PCP immediately.   Monitor for signs/symptoms of low blood glucose, such as, dizziness, altered mental status, or cool/clammy skin. In addition, monitor for signs/symptoms of high blood glucose, such as, feeling hot, dry, fatigued, or with increased thirst/urination.   Make regular podiatry appointments in order to have feet checked for wounds and uncontrolled toe nail growth to prevent infections, as well as, appointments with an ophthalmologist to monitor your vision.     PRINCIPAL DISCHARGE DIAGNOSIS  Diagnosis: NSTEMI (non-ST elevated myocardial infarction)  Assessment and Plan of Treatment: You were followed by the cardiology team.  You underwent a cardiac catheterization on 6/8 and 2 stents were placed.   Continue Plavix and Aspirin as prescribed.  Please schedulefollow up outpatient cardiology appointment  within 2 weeks with Dr. Tre Canela at [address] [facility] at .      SECONDARY DISCHARGE DIAGNOSES  Diagnosis: Diabetes  Assessment and Plan of Treatment: Your HgA1C during hospitalization was noted to be 9.1% (Provide such information to your primary care).  Continue Metformin only - Monitor blood glucose levels throughout the day before meals and at bedtime. Record blood sugars and bring to outpatient providers appointment in order to be reviewed by your doctor for management modifications. If your sugars are more than 400 or less than 70 you should contact your PCP immediately.   ** FOLLOW-UP WITH ENDOCRINE UPON DISCHARGE.  Monitor for signs/symptoms of low blood glucose, such as, dizziness, altered mental status, or cool/clammy skin. In addition, monitor for signs/symptoms of high blood glucose, such as, feeling hot, dry, fatigued, or with increased thirst/urination.   Make regular podiatry appointments in order to have feet checked for wounds and uncontrolled toe nail growth to prevent infections, as well as, appointments with an ophthalmologist to monitor your vision.    Diagnosis: Hypertension, unspecified type  Assessment and Plan of Treatment: Continue blood pressure medication regimen as directed. Monitor for any visual changes, headaches or dizziness.  Monitor blood pressure regularly.  Follow up with your primary care provider for further management for high blood pressure.

## 2021-06-09 NOTE — CONSULT NOTE ADULT - SUBJECTIVE AND OBJECTIVE BOX
HPI:  Patient is a 71 year old man with PMH uncontrolled DM2, HTN, HLD, admitted with chest pain, found to have NSTEMI, now s/p PCI. Consult called for management of uncontrolled DM2. HbA1c is 9.1%. Patient states that he was diagnosed with DM2 about 2 years ago. Takes Metformin 500 mg BID, adherent. Does not know how to check BG and does not have a glucometer at home. He does not have sx of neuropathy. Saw optho in 3/2021, no known retinopathy and has not received laser/injections to the eyes. No nephropathy. Diet wise, he does sometimes drink regular soda but dilutes it with water, no juice. Sometimes eats pasta but tries to not have too much.     PAST MEDICAL & SURGICAL HISTORY:  Hypertension, unspecified type  HLD  DM2    No significant past surgical history        FAMILY HISTORY:  Maternal family history of hypertension (Mother)    no known family history of DM    Social History:  no cigarette use  social alcohol use      Outpatient Medications:  · 	olmesartan-hydrochlorothiazide 40 mg-12.5 mg oral tablet: 1 tab(s) orally once a day  · 	Metoprolol Succinate ER 25 mg oral tablet, extended release: 1 tab(s) orally once a day  · 	metFORMIN 500 mg oral tablet: 1 tab(s) orally 2 times a day    MEDICATIONS  (STANDING):  aspirin enteric coated 81 milliGRAM(s) Oral daily  atorvastatin 80 milliGRAM(s) Oral at bedtime  clopidogrel Tablet 75 milliGRAM(s) Oral daily  dextrose 40% Gel 15 Gram(s) Oral once  dextrose 5%. 1000 milliLiter(s) (50 mL/Hr) IV Continuous <Continuous>  dextrose 5%. 1000 milliLiter(s) (100 mL/Hr) IV Continuous <Continuous>  dextrose 50% Injectable 25 Gram(s) IV Push once  dextrose 50% Injectable 12.5 Gram(s) IV Push once  dextrose 50% Injectable 25 Gram(s) IV Push once  glucagon  Injectable 1 milliGRAM(s) IntraMuscular once  insulin lispro (ADMELOG) corrective regimen sliding scale   SubCutaneous three times a day before meals  losartan 25 milliGRAM(s) Oral daily  metoprolol succinate ER 25 milliGRAM(s) Oral daily  sodium chloride 0.9%. 1000 milliLiter(s) (75 mL/Hr) IV Continuous <Continuous>    MEDICATIONS  (PRN):  nitroglycerin     SubLingual 0.4 milliGRAM(s) SubLingual every 5 minutes PRN Chest Pain      Allergies  No Known Allergies    Review of Systems:  Constitutional: No fever  Eyes: No blurry vision  Neuro: No tremors  HEENT: No pain  Cardiovascular: No chest pain, palpitations  Respiratory: No SOB, no cough  GI: No nausea, vomiting, abdominal pain  : No dysuria  Skin: no rash  Endocrine: no polyuria, polydipsia    ALL OTHER SYSTEMS REVIEWED AND NEGATIVE    PHYSICAL EXAM:  VITALS: T(C): 36.7 (06-09-21 @ 10:05)  T(F): 98.1 (06-09-21 @ 10:05), Max: 98.4 (06-09-21 @ 04:51)  HR: 81 (06-09-21 @ 10:05) (70 - 92)  BP: 161/95 (06-09-21 @ 10:05) (153/62 - 165/81)  RR:  (17 - 18)  SpO2:  (96% - 99%)  Wt(kg): --  GENERAL: NAD, well-developed  EYES: No proptosis, anicteric  HEENT:  Atraumatic, Normocephalic  THYROID: Normal size, no palpable nodules  RESPIRATORY: Clear to auscultation bilaterally; No rales, rhonchi, wheezing  CARDIOVASCULAR: Regular rate and rhythm; No murmurs; no peripheral edema  GI: Soft, nontender, non distended, normal bowel sounds  SKIN: Dry, intact, No ulcers on feet  MUSCULOSKELETAL: Full range of motion, normal strength  PSYCH: Alert and oriented x 3, reactive affect, euthymic mood     POCT Blood Glucose.: 172 mg/dL (06-09-21 @ 08:15)  POCT Blood Glucose.: 112 mg/dL (06-08-21 @ 21:24)  POCT Blood Glucose.: 188 mg/dL (06-08-21 @ 17:35)  POCT Blood Glucose.: 168 mg/dL (06-08-21 @ 14:02)  POCT Blood Glucose.: 190 mg/dL (06-08-21 @ 08:37)  POCT Blood Glucose.: 246 mg/dL (06-07-21 @ 22:01)                          13.0   5.00  )-----------( 126      ( 09 Jun 2021 06:38 )             40.2       06-09    138  |  105  |  16  ----------------------------<  205<H>  3.7   |  21<L>  |  1.26    EGFR if : 67  EGFR if non : 58<L>    Ca    9.1      06-09  Mg     1.9     06-09  Phos  2.9     06-09    TPro  6.6  /  Alb  3.6  /  TBili  0.5  /  DBili  x   /  AST  29  /  ALT  29  /  AlkPhos  78  06-09      Thyroid Function Tests:  06-08 @ 09:38 TSH 0.66 FreeT4 -- T3 -- Anti TPO -- Anti Thyroglobulin Ab -- TSI --      06-08 Chol 176 Direct LDL -- LDL calculated 106<H> HDL 51 Trig 93      HbA1c 9.1%         NOTE:  Pt is asking for someone that can just help.  She has dealt with [facility] pain clinics in the past and the wait was 1.5 months out.  Her last pain clinic wanted to surgically insert a pain pump, which is not ok for her to do in her opinion.  She is just asking that Dr. Lopez help in referring to a great pain  Physician that can really help her through her injury sustained during back surgery.  She is not against being involved in the [facility] system, but rather is hoping to have a great referral who can follow her closely and help improve her pain management.

## 2021-06-09 NOTE — CONSULT NOTE ADULT - PROBLEM SELECTOR RECOMMENDATION 9
- HbA1c 9.1%, goal < 7%  - BG currently at goal on sliding scale only, c/w low correction scale qac and qhs - most likely due to differences in diet in the hospital versus home  - check FS qac and qhs  - RD consult  - will follow  - for discharge: dc on Metformin 1 gram BID (resume 2 days after cath) and would see if SGLT2 inhibitor such as Jardiance 10 mg daily is covered by insurance (other alternatives are Farxiga 5 mg daily versus Invokana 100 mg daily versus Steglatro 5 mg daily). He requires glucometer teaching prior to dc, please provide script for new glucometer and supplies as well. Can follow up in the office if he wishes - [phone] - [address] [facility], [address], [facility] - HbA1c 9.1%, goal < 7%  - BG currently at goal on sliding scale only, c/w low correction scale qac and qhs - most likely due to differences in diet in the hospital versus home  - check FS qac and qhs  - RD consult  - will follow  - for discharge: dc on Metformin 1 gram BID (resume 2 days after cath) and would see if SGLT2 inhibitor such as Jardiance 10 mg daily is covered by insurance (other alternatives are Farxiga 5 mg daily versus Invokana 100 mg daily versus Steglatro 5 mg daily).     If SGLT2 inhibitor not covered by insurance, okay to discharge on Metformin 1 gram BID only with dietary modification, but he should follow up with PCP/endocrine as outpatient to assess if glycemic control is adequate and if second agent is needed. Not requiring insulin here. He requires glucometer teaching prior to dc, please provide script for new glucometer and supplies as well. Can follow up in the office if he wishes - [phone] - [address] [facility], [address], [facility]

## 2021-06-09 NOTE — DISCHARGE NOTE PROVIDER - HOSPITAL COURSE
71M PMHx DM, HTN admitted with chest pain found to have NSTEMI.      Acute chest pain.  Elevated Trops 465->562, ck 859, CKMB 21.7 consistent with NSTEMI  Cardiology consulted  CP: s/p ASA & Plavix load, on hep gtt,S/P Cath  Continue aspirin and plavix    Hypertension  Uncontrolled in setting of missing home medications (last doses were 6/6 per patient)  started on hydralazine 25mg PO TID then changed to Lopressor and losartan due to compliance issues      Diabetes mellitus.    HgA1c 9.1  metformin held       Hospital course discussed with medica attending. Patient is medically stable for discharge home.           71M h/o Type 2 DM and HTN p/w intermittent chest pain noted w/ NSTEMI s/p LHC 2 stents    Acute chest pain  - Elevated Trops 465 to 562; ; CKMB 21.7 consistent with NSTEMI  - EKG x 2 NSR with no acute ischemic changes noted   - Cardiology consulted  - S/p Plavix 600mg load in ED continued Plavix 75mg daily thereafter   - S/p ASA load in ED continued ASA 81mg daily thereafter   - Serial EKG q8h, trend cardiac enzymes q8h x 3 sets  - Started on Atorvastatin 80mg HS per cards   - Nitro SL q5min x 3 doses PRN for chest pain if needed  - ECHO EF 60% w/ mild diastolic dysfunction (Stage I) and NL RV/LV size/function  - S/p C 6/8 prox/mid RCA of 99% s/p 2 resolute Peewee stent; RRA access site  - On Aspirin and Plavix     Hypertension  - Uncontrolled in setting of missing home medications (Last doses were 6/6 per patient)  - As per cardiology hold olmesartan/HCTZ with plan for angiogram  - Currently controlled w/ Losartan and Torpol    Diabetes mellitus type II A1C 9.1%  - Hold Metformin while inpatient do not re-start until 48 hours post LHC  - ISS ordered for coverage  - Endocrine consulted    Dispo - Home no needs

## 2021-06-09 NOTE — PROGRESS NOTE ADULT - ATTENDING COMMENTS
Pt seen in CDU at 9:45AM, sitting in bed, denies chest pain since admission. Pending [facility] today  Overall, 71M w/T2DM, HTN admitted with chest pain found to have NSTEMI, pending cath today.  #CP: s/p ASA & Plavix load, on hep gtt, pending cath today  Trops/Ekg reviewed   f/u A1c, lipid, TSH   #T2DM: hold metformin, check A1c, c/w ISS.  #HTN: uncontrolled, BP above goal, hold ACE/Arb, c/w hydral 25mg TID for now, uptitrate as needed  d/w ACP  Called PCP again, no answer, unable to leave message
Personally saw and examined patient  Labs and vitals reviewed  Agree with above assessment and plan  s/p cath with PCI  cont dapt  no chest pain, sob  will need outpt cards follow up

## 2021-06-09 NOTE — DISCHARGE NOTE PROVIDER - NSDCMRMEDTOKEN_GEN_ALL_CORE_FT
aspirin 81 mg oral delayed release tablet: 1 tab(s) orally once a day  atorvastatin 80 mg oral tablet: 1 tab(s) orally once a day (at bedtime)  clopidogrel 75 mg oral tablet: 1 tab(s) orally once a day  losartan 25 mg oral tablet: 1 tab(s) orally once a day  metFORMIN 500 mg oral tablet: 1 tab(s) orally 2 times a day  Metoprolol Succinate ER 25 mg oral tablet, extended release: 1 tab(s) orally once a day   aspirin 81 mg oral delayed release tablet: 1 tab(s) orally once a day  atorvastatin 80 mg oral tablet: 1 tab(s) orally once a day (at bedtime)  clopidogrel 75 mg oral tablet: 1 tab(s) orally once a day  glucometer (per patient&#x27;s insurance): Test blood sugars four times a day. Dispense #1 glucometer.  losartan 25 mg oral tablet: 1 tab(s) orally once a day  metFORMIN 500 mg oral tablet: 1 tab(s) orally 2 times a day  Metoprolol Succinate ER 25 mg oral tablet, extended release: 1 tab(s) orally once a day   alcohol swabs : Apply topically to affected area 4 times a day   aspirin 81 mg oral delayed release tablet: 1 tab(s) orally once a day  atorvastatin 80 mg oral tablet: 1 tab(s) orally once a day (at bedtime)  clopidogrel 75 mg oral tablet: 1 tab(s) orally once a day  glucometer (per patient&#x27;s insurance): Test blood sugars four times a day. Dispense #1 glucometer.  Jardiance 10 mg oral tablet: 1 tab(s) orally once a day   lancets: 1 application subcutaneously 4 times a day   losartan 25 mg oral tablet: 1 tab(s) orally once a day  metFORMIN 1000 mg oral tablet: 1 tab(s) orally 2 times a day   Metoprolol Succinate ER 25 mg oral tablet, extended release: 1 tab(s) orally once a day  test strips (per patient&#x27;s insurance): 1 application subcutaneously 4 times a day. ** Compatible with patient&#x27;s glucometer **   alcohol swabs : Apply topically to affected area 4 times a day   aspirin 81 mg oral delayed release tablet: 1 tab(s) orally once a day  atorvastatin 80 mg oral tablet: 1 tab(s) orally once a day (at bedtime)  clopidogrel 75 mg oral tablet: 1 tab(s) orally once a day  glucometer (per patient&#x27;s insurance): Test blood sugars four times a day. Dispense #1 glucometer.  lancets: 1 application subcutaneously 4 times a day   losartan 25 mg oral tablet: 1 tab(s) orally once a day  metFORMIN 1000 mg oral tablet: 1 tab(s) orally 2 times a day   Metoprolol Succinate ER 25 mg oral tablet, extended release: 1 tab(s) orally once a day  test strips (per patient&#x27;s insurance): 1 application subcutaneously 4 times a day. ** Compatible with patient&#x27;s glucometer **

## 2021-06-09 NOTE — DISCHARGE NOTE PROVIDER - CARE PROVIDER_API CALL
Tre Canela)  Internal Medicine  [address] [place] [address]  Phone: [phone]  Fax: ()-  Follow Up Time: 2 weeks

## 2021-06-09 NOTE — DISCHARGE NOTE PROVIDER - NSDCFUADDAPPT_GEN_ALL_CORE_FT
Outpatient Endocrine clinic [phone] - [address] [facility], [address], [facility]

## 2021-06-09 NOTE — DISCHARGE NOTE NURSING/CASE MANAGEMENT/SOCIAL WORK - PATIENT PORTAL LINK FT
You can access the FollowMyHealth Patient Portal offered by [facility] by registering at the following website: [email or web address]. By joining [employer]’s FollowMyHealth portal, you will also be able to view your health information using other applications (apps) compatible with our system.

## 2021-06-09 NOTE — CONSULT NOTE ADULT - PROBLEM SELECTOR RECOMMENDATION 3
- c/w Lipitor 80 mg daily   -     Lorri Tripahti MD   Pager # [phone]  On evenings and weekends, please call the office at [phone] or page endocrine fellow on call. Please note that this patient may be followed by different provider tomorrow. If no answer, contact the office. - c/w Lipitor 80 mg daily   -     Plan discussed with SHA Ball.     Lorri Tripathi MD   Pager # [phone]  On evenings and weekends, please call the office at [phone] or page endocrine fellow on call. Please note that this patient may be followed by different provider tomorrow. If no answer, contact the office.

## 2021-06-09 NOTE — PROGRESS NOTE ADULT - ASSESSMENT
70 yo M with PMH of HTN and T2DM p/w NSTE-ACS s/p PCI to 99% mRCA. Tolerated well.     NSTE-ACS s/p PCI to RCA   HTN     -cont DAPT with ASA and Plavix  -start Toprol XL 25 mg QD and Losartan 25 mg QD   -cont high intensity statin   -pending TTE     No barrier for discharge from cardiovascular standpoint. Please ensure patient has appropriate means of insurance coverage for all cardiovascular medications.  Medication compliance with close primary care and specialist follow-ups,  heart heathy diet, >30 minutes daily aerobic exercise, smoking cessation, EtOH abstinence strongly reinforced at bedside.  Upon discharge please arrange for the outpatient cardiology follow-up within 2 weeks with Dr. Tre [facility]ela [facility] [facility]      Thank you for allowing us to participate in the care of your patient. If you have any questions or concerns please do not hesitate to contact us 24/7.     Lux Stern MD x [phone]  Cardiology Fellow, [facility]/KENNETH  All Cardiology service information can be found 24/7 on [email or web address], password: [email or web address]       72 yo M with PMH of HTN and T2DM p/w NSTE-ACS s/p PCI to 99% mRCA. Tolerated well.     NSTE-ACS s/p PCI to RCA   HTN     -cont DAPT with ASA and Plavix  -start Toprol XL 25 mg QD and Losartan 25 mg QD   -would d/c hydralazine due to compliance issue   -cont high intensity statin   -pending TTE     No barrier for discharge from cardiovascular standpoint. Please ensure patient has appropriate means of insurance coverage for all cardiovascular medications.  Medication compliance with close primary care and specialist follow-ups,  heart heathy diet, >30 minutes daily aerobic exercise, smoking cessation, EtOH abstinence strongly reinforced at bedside.  Upon discharge please arrange for the outpatient cardiology follow-up within 2 weeks with Dr. Tre Canela at [address] [facility] at      Thank you for allowing us to participate in the care of your patient. If you have any questions or concerns please do not hesitate to contact us 24/7.     Lux Stern MD x [phone]  Cardiology Fellow, [facility]-NS/KENNETH  All Cardiology service information can be found 24/7 on [email or web address], password: [email or web address]

## 2021-06-09 NOTE — PROGRESS NOTE ADULT - PROBLEM SELECTOR PLAN 4
- DVT ppx - on Heparin gtt for ACS  - Needs to f/u with Dr. Ramirez office at [phone] in 1 week post DC.   - Cleared for discharge from Cardiology team. - DVT ppx - on Heparin gtt for ACS  - Needs to f/u with Dr. Ramirez office at [phone] in 1 week post DC.   - Cleared for discharge from Cardiology team.  - DC planning time: 34 min in coordinating DC plan with pt/team.

## 2021-06-10 ENCOUNTER — FORM ENCOUNTER (OUTPATIENT)
Age: 71
End: 2021-06-10

## 2021-06-10 VITALS
OXYGEN SATURATION: 99 % | TEMPERATURE: 98 F | RESPIRATION RATE: 18 BRPM | HEART RATE: 74 BPM | DIASTOLIC BLOOD PRESSURE: 79 MMHG | SYSTOLIC BLOOD PRESSURE: 148 MMHG

## 2021-06-10 LAB
ANION GAP SERPL CALC-SCNC: 11 MMOL/L — SIGNIFICANT CHANGE UP (ref 7–14)
BUN SERPL-MCNC: 15 MG/DL — SIGNIFICANT CHANGE UP (ref 7–23)
CALCIUM SERPL-MCNC: 8.9 MG/DL — SIGNIFICANT CHANGE UP (ref 8.4–10.5)
CHLORIDE SERPL-SCNC: 106 MMOL/L — SIGNIFICANT CHANGE UP (ref 98–107)
CO2 SERPL-SCNC: 23 MMOL/L — SIGNIFICANT CHANGE UP (ref 22–31)
CREAT SERPL-MCNC: 1.28 MG/DL — SIGNIFICANT CHANGE UP (ref 0.5–1.3)
GLUCOSE BLDC GLUCOMTR-MCNC: 170 MG/DL — HIGH (ref 70–99)
GLUCOSE BLDC GLUCOMTR-MCNC: 175 MG/DL — HIGH (ref 70–99)
GLUCOSE SERPL-MCNC: 160 MG/DL — HIGH (ref 70–99)
HCT VFR BLD CALC: 39.8 % — SIGNIFICANT CHANGE UP (ref 39–50)
HGB BLD-MCNC: 13.1 G/DL — SIGNIFICANT CHANGE UP (ref 13–17)
MAGNESIUM SERPL-MCNC: 1.9 MG/DL — SIGNIFICANT CHANGE UP (ref 1.6–2.6)
MCHC RBC-ENTMCNC: 30.8 PG — SIGNIFICANT CHANGE UP (ref 27–34)
MCHC RBC-ENTMCNC: 32.9 GM/DL — SIGNIFICANT CHANGE UP (ref 32–36)
MCV RBC AUTO: 93.6 FL — SIGNIFICANT CHANGE UP (ref 80–100)
NRBC # BLD: 0 /100 WBCS — SIGNIFICANT CHANGE UP
NRBC # FLD: 0 K/UL — SIGNIFICANT CHANGE UP
PHOSPHATE SERPL-MCNC: 3.7 MG/DL — SIGNIFICANT CHANGE UP (ref 2.5–4.5)
PLATELET # BLD AUTO: 127 K/UL — LOW (ref 150–400)
POTASSIUM SERPL-MCNC: 4 MMOL/L — SIGNIFICANT CHANGE UP (ref 3.5–5.3)
POTASSIUM SERPL-SCNC: 4 MMOL/L — SIGNIFICANT CHANGE UP (ref 3.5–5.3)
RBC # BLD: 4.25 M/UL — SIGNIFICANT CHANGE UP (ref 4.2–5.8)
RBC # FLD: 12.4 % — SIGNIFICANT CHANGE UP (ref 10.3–14.5)
SODIUM SERPL-SCNC: 140 MMOL/L — SIGNIFICANT CHANGE UP (ref 135–145)
WBC # BLD: 4.53 K/UL — SIGNIFICANT CHANGE UP (ref 3.8–10.5)
WBC # FLD AUTO: 4.53 K/UL — SIGNIFICANT CHANGE UP (ref 3.8–10.5)

## 2021-06-10 PROCEDURE — 99232 SBSQ HOSP IP/OBS MODERATE 35: CPT

## 2021-06-10 PROCEDURE — 99239 HOSP IP/OBS DSCHRG MGMT >30: CPT

## 2021-06-10 RX ORDER — METFORMIN HYDROCHLORIDE 850 MG/1
1 TABLET ORAL
Qty: 60 | Refills: 0
Start: 2021-06-10 | End: 2021-07-09

## 2021-06-10 RX ORDER — CLOPIDOGREL BISULFATE 75 MG/1
1 TABLET, FILM COATED ORAL
Qty: 30 | Refills: 0
Start: 2021-06-10 | End: 2021-07-09

## 2021-06-10 RX ORDER — EMPAGLIFLOZIN 10 MG/1
1 TABLET, FILM COATED ORAL
Qty: 30 | Refills: 0
Start: 2021-06-10 | End: 2021-07-09

## 2021-06-10 RX ADMIN — Medication 25 MILLIGRAM(S): at 05:11

## 2021-06-10 RX ADMIN — Medication 1: at 12:41

## 2021-06-10 RX ADMIN — LOSARTAN POTASSIUM 25 MILLIGRAM(S): 100 TABLET, FILM COATED ORAL at 05:11

## 2021-06-10 RX ADMIN — Medication 1: at 08:42

## 2021-06-10 RX ADMIN — CLOPIDOGREL BISULFATE 75 MILLIGRAM(S): 75 TABLET, FILM COATED ORAL at 11:41

## 2021-06-10 RX ADMIN — Medication 81 MILLIGRAM(S): at 11:42

## 2021-06-10 NOTE — PROGRESS NOTE ADULT - PROBLEM SELECTOR PLAN 1
- c/w low correction scale qac and qhs  - consistent carb diet  - check FS qac and qhs  - RD consult  - will follow  - for discharge: can be discharged home on Metformin 1 gram BID only with dietary changes. To follow up with his PCP.
- Elevated Trops 465->562, ck 859, CKMB 21.7 consistent with NSTEMI.   - EKG x 2 NSR with no acute ischemic changes noted.  - Cardiology evaluation appreciated.  - s/p PCI: s/p PCI to 99% mRCA  - Continue ASA/Plavix. Toprol XL 25mg daily. Losartan 25mg daily.   - Needs outpatient f/u with Dr. Maguire (Cardiologist) in 2 weeks.   - Needs to f/u with Dr. Ramirez office at [phone] in 1 week post DC.   - Cleared for discharge from Cardiology team.
- Elevated Trops 465->562, ck 859, CKMB 21.7 consistent with NSTEMI.   - EKG x 2 NSR with no acute ischemic changes noted.  - Cardiology evaluation appreciated.  - s/p PCI: s/p PCI to 99% mRCA  - Continue ASA/Plavix. Toprol XL 25mg daily. Losartan 25mg daily.   - Needs outpatient f/u with Dr. Maguire (Cardiologist) in 2 weeks.   - Needs to f/u with Dr. Ramirez office at [phone] in 1 week post DC.   - Cleared for discharge from Cardiology team.

## 2021-06-10 NOTE — PROGRESS NOTE ADULT - PROBLEM SELECTOR PLAN 3
- HbA1c 9.1%, goal < 7%  - Continue POC monitoring and ISS while inpatient.   - Endo recommendations for DC planning.   - DC with Metformin 1000mg BID, Jardiance 10mg.   - POC monitoring w/ glucometer, DM teaching for DC .
- c/w Lipitor 80 mg qhs    Lorri Tripathi MD   Pager # [phone]  On evenings and weekends, please call the office at [phone] or page endocrine fellow on call. Please note that this patient may be followed by different provider tomorrow. If no answer, contact the office.
- HbA1c 9.1%, goal < 7%  - Continue POC monitoring and ISS while inpatient.   - Endo recommendations for DC planning.   - DC with Metformin 1000mg BID, Jardiance 10mg.   - POC monitoring w/ glucometer, DM teaching for DC .

## 2021-06-10 NOTE — PROGRESS NOTE ADULT - PROBLEM SELECTOR PLAN 4
- DVT ppx - on Heparin gtt for ACS  - Needs to f/u with Dr. Ramirez office at [phone] in 1 week post DC.   - Cleared for discharge from Cardiology team.  - DC planning time: 34 min in coordinating DC plan with pt/team.  - Per pt request, updated Nephew Dr. Arcenio Benitez on current condition/plan.

## 2021-06-10 NOTE — PROGRESS NOTE ADULT - PROBLEM SELECTOR PLAN 2
- BP above goal  - c/w Losartan and Metoprolol
- Uncontrolled in setting of missing home medications (last doses were 6/6).  - Continue Losartan and Toprol on discharge per Cardio.
- Uncontrolled in setting of missing home medications (last doses were 6/6).  - Continue Losartan and Toprol on discharge per Cardio.

## 2021-06-10 NOTE — PROGRESS NOTE ADULT - ASSESSMENT
71 year old man with PMH uncontrolled DM2, HTN, HLD, admitted with chest pain, found to have NSTEMI, now s/p PCI. Consult called for management of uncontrolled DM2. HbA1c is 9.1%. BG goal 100-180 mg/dL.

## 2021-06-10 NOTE — PROGRESS NOTE ADULT - SUBJECTIVE AND OBJECTIVE BOX
PROGRESS NOTE:     Patient is a 71y old  Male who presents with a chief complaint of Chest Pain (10 Jarred 2021 15:05)    SUBJECTIVE / OVERNIGHT EVENTS: Patient seen and evaluated at bedside. No acute distress evident no overnight events. Reports feeling well, no sob, chest pain, abd pain, nausea, vomiting, dizziness, palpitations or further complaints.     ADDITIONAL REVIEW OF SYSTEMS:    MEDICATIONS  (STANDING):  aspirin enteric coated 81 milliGRAM(s) Oral daily  atorvastatin 80 milliGRAM(s) Oral at bedtime  clopidogrel Tablet 75 milliGRAM(s) Oral daily  dextrose 40% Gel 15 Gram(s) Oral once  dextrose 5%. 1000 milliLiter(s) (50 mL/Hr) IV Continuous <Continuous>  dextrose 5%. 1000 milliLiter(s) (100 mL/Hr) IV Continuous <Continuous>  dextrose 50% Injectable 25 Gram(s) IV Push once  dextrose 50% Injectable 12.5 Gram(s) IV Push once  dextrose 50% Injectable 25 Gram(s) IV Push once  glucagon  Injectable 1 milliGRAM(s) IntraMuscular once  insulin lispro (ADMELOG) corrective regimen sliding scale   SubCutaneous three times a day before meals  insulin lispro (ADMELOG) corrective regimen sliding scale   SubCutaneous at bedtime  losartan 25 milliGRAM(s) Oral daily  metoprolol succinate ER 25 milliGRAM(s) Oral daily  sodium chloride 0.9%. 1000 milliLiter(s) (75 mL/Hr) IV Continuous <Continuous>    MEDICATIONS  (PRN):  nitroglycerin     SubLingual 0.4 milliGRAM(s) SubLingual every 5 minutes PRN Chest Pain    CAPILLARY BLOOD GLUCOSE  POCT Blood Glucose.: 175 mg/dL (10 Jarred 2021 12:14)  POCT Blood Glucose.: 170 mg/dL (10 Jarred 2021 08:31)  POCT Blood Glucose.: 198 mg/dL (09 Jun 2021 22:31)  POCT Blood Glucose.: 127 mg/dL (09 Jun 2021 17:44)    I&O's Summary    09 Jun 2021 07:01  -  10 Jarred 2021 07:00  --------------------------------------------------------  IN: 935 mL / OUT: 1250 mL / NET: -315 mL    PHYSICAL EXAM:  Vital Signs Last 24 Hrs  T(C): 36.6 (10 Jarred 2021 11:37), Max: 36.8 (09 Jun 2021 21:34)  T(F): 97.9 (10 Jarred 2021 11:37), Max: 98.3 (09 Jun 2021 21:34)  HR: 74 (10 Jarred 2021 11:37) (72 - 74)  BP: 148/79 (10 Jarred 2021 11:37) (143/80 - 160/78)  BP(mean): --  RR: 18 (10 Jarred 2021 11:37) (16 - 18)  SpO2: 99% (10 Jarred 2021 11:37) (96% - 99%)    CONSTITUTIONAL: NAD, well-developed, comfortable   RESPIRATORY: Normal respiratory effort; lungs are clear to auscultation bilaterally  CARDIOVASCULAR: Regular rate and rhythm, normal S1 and S2  No lower extremity edema; Peripheral pulses are 2+ bilaterally  ABDOMEN: Nontender to palpation, normoactive bowel sounds  MUSCLOSKELETAL: no clubbing or cyanosis of digits; no joint swelling or tenderness to palpation  PSYCH: A+O to person, place, and time; affect appropriate    LABS: reviewed.                        13.1   4.53  )-----------( 127      ( 10 Jarred 2021 06:23 )             39.8     06-10    140  |  106  |  15  ----------------------------<  160<H>  4.0   |  23  |  1.28    Ca    8.9      10 Jarred 2021 06:23  Phos  3.7     06-10  Mg     1.9     06-10    TPro  6.6  /  Alb  3.6  /  TBili  0.5  /  DBili  x   /  AST  29  /  ALT  29  /  AlkPhos  78  06-09    RADIOLOGY & ADDITIONAL TESTS:  Results Reviewed:   Imaging Personally Reviewed:  Electrocardiogram Personally Reviewed:    COORDINATION OF CARE:  Care Discussed with Consultants/Other Providers [Y/N]:  Prior or Outpatient Records Reviewed [Y/N]:  
Patient seen and examined at bedside.    Overnight Events:   Pt feels well, no chest pain, pressure, shortness of breath or palpitation.      REVIEW OF SYSTEMS:  Constitutional:     [x ] negative [ ] fevers [ ] chills [ ] weight loss [ ] weight gain  HEENT:                  [x ] negative [ ] dry eyes [ ] eye irritation [ ] postnasal drip [ ] nasal congestion  CV:                         [ x] negative  [ ] chest pain [ ] orthopnea [ ] palpitations [ ] murmur  Resp:                     [ x] negative [ ] cough [ ] shortness of breath [ ] dyspnea [ ] wheezing [ ] sputum [ ]hemoptysis  GI:                          [ x] negative [ ] nausea [ ] vomiting [ ] diarrhea [ ] constipation [ ] abd pain [ ] dysphagia   :                        [ x] negative [ ] dysuria [ ] nocturia [ ] hematuria [ ] increased urinary frequency  Musculoskeletal: [ x] negative [ ] back pain [ ] myalgias [ ] arthralgias [ ] fracture  Skin:                       [ x] negative [ ] rash [ ] itch  Neurological:        [x ] negative [ ] headache [ ] dizziness [ ] syncope [ ] weakness [ ] numbness  Psychiatric:           [ x] negative [ ] anxiety [ ] depression  Endocrine:            [ x] negative [ ] diabetes [ ] thyroid problem  Heme/Lymph:      [ x] negative [ ] anemia [ ] bleeding problem  Allergic/Immune: [ x] negative [ ] itchy eyes [ ] nasal discharge [ ] hives [ ] angioedema    [ x] All other systems negative  [ ] Unable to assess ROS due to    Current Meds:  aspirin enteric coated 81 milliGRAM(s) Oral daily  atorvastatin 80 milliGRAM(s) Oral at bedtime  clopidogrel Tablet 75 milliGRAM(s) Oral daily  dextrose 40% Gel 15 Gram(s) Oral once  dextrose 5%. 1000 milliLiter(s) IV Continuous <Continuous>  dextrose 5%. 1000 milliLiter(s) IV Continuous <Continuous>  dextrose 50% Injectable 25 Gram(s) IV Push once  dextrose 50% Injectable 12.5 Gram(s) IV Push once  dextrose 50% Injectable 25 Gram(s) IV Push once  glucagon  Injectable 1 milliGRAM(s) IntraMuscular once  hydrALAZINE 25 milliGRAM(s) Oral every 8 hours  insulin lispro (ADMELOG) corrective regimen sliding scale   SubCutaneous three times a day before meals  nitroglycerin     SubLingual 0.4 milliGRAM(s) SubLingual every 5 minutes PRN  sodium chloride 0.9%. 1000 milliLiter(s) IV Continuous <Continuous>      PAST MEDICAL & SURGICAL HISTORY:  Hypertension, unspecified type    Diabetes mellitus    No significant past surgical history        Vitals:  T(F): 98.4 (), Max: 98.4 ()  HR: 79 () (65 - 92)  BP: 165/81 () (153/62 - 175/88)  RR: 18 ()  SpO2: 97% ()  I&O's Summary      Physical Exam:    NAD   No JVD   CTABL   Nml rate. regular rhythm. no MRG   Soft   No edema     RRA site CDI no ecchymosis, tenderness or bleeding                           13.0   5.00  )-----------( 126      ( 2021 06:38 )             40.2         138  |  105  |  16  ----------------------------<  205<H>  3.7   |  21<L>  |  1.26    Ca    9.1      2021 06:38  Phos  2.9       Mg     1.9         TPro  6.6  /  Alb  3.6  /  TBili  0.5  /  DBili  x   /  AST  29  /  ALT  29  /  AlkPhos  78      PT/INR - ( 2021 02:20 )   PT: 12.6 sec;   INR: 1.11 ratio         PTT - ( 2021 09:34 )  PTT:72.2 sec  CARDIAC MARKERS ( 2021 09:34 )  x     / x     / 636 U/L / x     / 14.4 ng/mL  CARDIAC MARKERS ( 2021 01:19 )  x     / x     / 859 U/L / x     / 21.7 ng/mL      Serum Pro-Brain Natriuretic Peptide: 156 pg/mL ( @ 01:08)    Total Cholesterol: 176  LDL: --  HDL: 51  T        Cardiovascular Testings:        Interpretation of Telemetry: NSR  
Patient seen and examined at bedside.    Overnight Events:   No events overnight. denies any cp currently.     REVIEW OF SYSTEMS:  Constitutional:     [x ] negative [ ] fevers [ ] chills [ ] weight loss [ ] weight gain  HEENT:                  [x ] negative [ ] dry eyes [ ] eye irritation [ ] postnasal drip [ ] nasal congestion  CV:                         [ x] negative  [ ] chest pain [ ] orthopnea [ ] palpitations [ ] murmur  Resp:                     [ x] negative [ ] cough [ ] shortness of breath [ ] dyspnea [ ] wheezing [ ] sputum [ ]hemoptysis  GI:                          [ x] negative [ ] nausea [ ] vomiting [ ] diarrhea [ ] constipation [ ] abd pain [ ] dysphagia   :                        [ x] negative [ ] dysuria [ ] nocturia [ ] hematuria [ ] increased urinary frequency  Musculoskeletal: [ x] negative [ ] back pain [ ] myalgias [ ] arthralgias [ ] fracture  Skin:                       [ x] negative [ ] rash [ ] itch  Neurological:        [x ] negative [ ] headache [ ] dizziness [ ] syncope [ ] weakness [ ] numbness  Psychiatric:           [ x] negative [ ] anxiety [ ] depression  Endocrine:            [ x] negative [ ] diabetes [ ] thyroid problem  Heme/Lymph:      [ x] negative [ ] anemia [ ] bleeding problem  Allergic/Immune: [ x] negative [ ] itchy eyes [ ] nasal discharge [ ] hives [ ] angioedema    [ x] All other systems negative  [ ] Unable to assess ROS due to    Current Meds:  dextrose 40% Gel 15 Gram(s) Oral once  dextrose 5%. 1000 milliLiter(s) IV Continuous <Continuous>  dextrose 5%. 1000 milliLiter(s) IV Continuous <Continuous>  dextrose 50% Injectable 25 Gram(s) IV Push once  dextrose 50% Injectable 12.5 Gram(s) IV Push once  dextrose 50% Injectable 25 Gram(s) IV Push once  glucagon  Injectable 1 milliGRAM(s) IntraMuscular once  heparin   Injectable 5300 Unit(s) IV Push every 6 hours PRN  heparin  Infusion.  Unit(s)/Hr IV Continuous <Continuous>  hydrALAZINE 25 milliGRAM(s) Oral every 8 hours  insulin lispro (ADMELOG) corrective regimen sliding scale   SubCutaneous every 6 hours  metoprolol tartrate 25 milliGRAM(s) Oral two times a day  nitroglycerin     SubLingual 0.4 milliGRAM(s) SubLingual every 5 minutes PRN      PAST MEDICAL & SURGICAL HISTORY:  Hypertension, unspecified type    Diabetes mellitus    No significant past surgical history        Vitals:  T(F): 97.6 (06-08), Max: 99.1 (06-07)  HR: 65 (06-08) (60 - 75)  BP: 175/88 (06-08) (166/66 - 194/88)  RR: 16 (06-08)  SpO2: 98% (06-08)  I&O's Summary      Physical Exam:  NAD   No JVD   CTABL   Nml rate. regular rhythm. no MRG   Soft   No edema                           12.3   5.99  )-----------( 133      ( 08 Jun 2021 01:08 )             38.0     06-08    137  |  102  |  15  ----------------------------<  226<H>  4.0   |  23  |  1.20    Ca    9.5      08 Jun 2021 01:08    TPro  7.0  /  Alb  4.0  /  TBili  0.5  /  DBili  x   /  AST  47<H>  /  ALT  38  /  AlkPhos  75  06-08    PT/INR - ( 08 Jun 2021 02:20 )   PT: 12.6 sec;   INR: 1.11 ratio         PTT - ( 08 Jun 2021 02:20 )  PTT:28.2 sec  CARDIAC MARKERS ( 08 Jun 2021 01:19 )  x     / x     / 859 U/L / x     / 21.7 ng/mL      Serum Pro-Brain Natriuretic Peptide: 156 pg/mL (06-08 @ 01:08)          New ECG(s): Personally reviewed  NSR   
    Chief Complaint: f/u DM2    History:  Pt seen at bedside this afternoon, tolerating po, no nausea, vomiting, abdominal pain. Not requiring insulin. He states that he is motivated to change his diet.    MEDICATIONS  (STANDING):  aspirin enteric coated 81 milliGRAM(s) Oral daily  atorvastatin 80 milliGRAM(s) Oral at bedtime  clopidogrel Tablet 75 milliGRAM(s) Oral daily  dextrose 40% Gel 15 Gram(s) Oral once  dextrose 5%. 1000 milliLiter(s) (50 mL/Hr) IV Continuous <Continuous>  dextrose 5%. 1000 milliLiter(s) (100 mL/Hr) IV Continuous <Continuous>  dextrose 50% Injectable 25 Gram(s) IV Push once  dextrose 50% Injectable 12.5 Gram(s) IV Push once  dextrose 50% Injectable 25 Gram(s) IV Push once  glucagon  Injectable 1 milliGRAM(s) IntraMuscular once  insulin lispro (ADMELOG) corrective regimen sliding scale   SubCutaneous three times a day before meals  insulin lispro (ADMELOG) corrective regimen sliding scale   SubCutaneous at bedtime  losartan 25 milliGRAM(s) Oral daily  metoprolol succinate ER 25 milliGRAM(s) Oral daily  sodium chloride 0.9%. 1000 milliLiter(s) (75 mL/Hr) IV Continuous <Continuous>    MEDICATIONS  (PRN):  nitroglycerin     SubLingual 0.4 milliGRAM(s) SubLingual every 5 minutes PRN Chest Pain      Allergies  No Known Allergies    Review of Systems:  ALL OTHER SYSTEMS REVIEWED AND NEGATIVE    PHYSICAL EXAM:  VITALS: T(C): 36.6 (06-10-21 @ 11:37)  T(F): 97.9 (06-10-21 @ 11:37), Max: 98.3 (06-09-21 @ 21:34)  HR: 74 (06-10-21 @ 11:37) (72 - 74)  BP: 148/79 (06-10-21 @ 11:37) (143/80 - 160/78)  RR:  (16 - 18)  SpO2:  (96% - 99%)  Wt(kg): --  GENERAL: NAD, well-developed  EYES: No proptosis, anicteric  HEENT:  Atraumatic, Normocephalic  RESPIRATORY: + air movement bilaterally, no respiratory distress  PSYCH: Alert and oriented x 3, reactive affect, euthymic mood     POCT Blood Glucose.: 175 mg/dL (06-10-21 @ 12:14)  POCT Blood Glucose.: 170 mg/dL (06-10-21 @ 08:31)  POCT Blood Glucose.: 198 mg/dL (06-09-21 @ 22:31)  POCT Blood Glucose.: 127 mg/dL (06-09-21 @ 17:44)  POCT Blood Glucose.: 140 mg/dL (06-09-21 @ 13:20)  POCT Blood Glucose.: 172 mg/dL (06-09-21 @ 08:15)  POCT Blood Glucose.: 112 mg/dL (06-08-21 @ 21:24)  POCT Blood Glucose.: 188 mg/dL (06-08-21 @ 17:35)  POCT Blood Glucose.: 168 mg/dL (06-08-21 @ 14:02)  POCT Blood Glucose.: 190 mg/dL (06-08-21 @ 08:37)  POCT Blood Glucose.: 246 mg/dL (06-07-21 @ 22:01)      06-10    140  |  106  |  15  ----------------------------<  160<H>  4.0   |  23  |  1.28    EGFR if : 66  EGFR if non : 57<L>    Ca    8.9      06-10  Mg     1.9     06-10  Phos  3.7     06-10    TPro  6.6  /  Alb  3.6  /  TBili  0.5  /  DBili  x   /  AST  29  /  ALT  29  /  AlkPhos  78  06-09          Thyroid Function Tests:  06-08 @ 09:38 TSH 0.66 FreeT4 -- T3 -- Anti TPO -- Anti Thyroglobulin Ab -- TSI --                      
PROGRESS NOTE:     Patient is a 71y old  Male who presents with a chief complaint of Chest Pain (09 Jun 2021 10:06)    SUBJECTIVE / OVERNIGHT EVENTS: Patient seen and evaluated at bedside. NO acute distress evident, no overnight events. Reports no sob, chest pain, abdominal pain, nausea, vomiting,     ADDITIONAL REVIEW OF SYSTEMS:    MEDICATIONS  (STANDING):  aspirin enteric coated 81 milliGRAM(s) Oral daily  atorvastatin 80 milliGRAM(s) Oral at bedtime  clopidogrel Tablet 75 milliGRAM(s) Oral daily  dextrose 40% Gel 15 Gram(s) Oral once  dextrose 5%. 1000 milliLiter(s) (50 mL/Hr) IV Continuous <Continuous>  dextrose 5%. 1000 milliLiter(s) (100 mL/Hr) IV Continuous <Continuous>  dextrose 50% Injectable 25 Gram(s) IV Push once  dextrose 50% Injectable 12.5 Gram(s) IV Push once  dextrose 50% Injectable 25 Gram(s) IV Push once  glucagon  Injectable 1 milliGRAM(s) IntraMuscular once  insulin lispro (ADMELOG) corrective regimen sliding scale   SubCutaneous at bedtime  insulin lispro (ADMELOG) corrective regimen sliding scale   SubCutaneous three times a day before meals  losartan 25 milliGRAM(s) Oral daily  metoprolol succinate ER 25 milliGRAM(s) Oral daily  sodium chloride 0.9%. 1000 milliLiter(s) (75 mL/Hr) IV Continuous <Continuous>    MEDICATIONS  (PRN):  nitroglycerin     SubLingual 0.4 milliGRAM(s) SubLingual every 5 minutes PRN Chest Pain    CAPILLARY BLOOD GLUCOSE  POCT Blood Glucose.: 140 mg/dL (09 Jun 2021 13:20)  POCT Blood Glucose.: 172 mg/dL (09 Jun 2021 08:15)  POCT Blood Glucose.: 112 mg/dL (08 Jun 2021 21:24)  POCT Blood Glucose.: 188 mg/dL (08 Jun 2021 17:35)    I&O's Summary    09 Jun 2021 07:01  -  09 Jun 2021 16:40  --------------------------------------------------------  IN: 0 mL / OUT: 500 mL / NET: -500 mL    PHYSICAL EXAM:  Vital Signs Last 24 Hrs  T(C): 36.7 (09 Jun 2021 10:05), Max: 36.9 (09 Jun 2021 04:51)  T(F): 98.1 (09 Jun 2021 10:05), Max: 98.4 (09 Jun 2021 04:51)  HR: 81 (09 Jun 2021 10:05) (70 - 92)  BP: 161/95 (09 Jun 2021 10:05) (153/62 - 165/81)  BP(mean): --  RR: 18 (09 Jun 2021 10:05) (17 - 18)  SpO2: 96% (09 Jun 2021 10:05) (96% - 99%)    CONSTITUTIONAL: NAD, well-developed, middle aged man, comfortable appearing.   RESPIRATORY: Normal respiratory effort; lungs are clear to auscultation bilaterally  CARDIOVASCULAR: Regular rate and rhythm, normal S1 and S2  No lower extremity edema; Peripheral pulses are 2+ bilaterally  ABDOMEN: Nontender to palpation, normoactive bowel sounds, no rebound/guarding; No hepatosplenomegaly  MUSCLOSKELETAL: no clubbing or cyanosis of digits; no joint swelling or tenderness to palpation  PSYCH: A+O to person, place, and time; affect appropriate    LABS: reviewed                         13.0   5.00  )-----------( 126      ( 09 Jun 2021 06:38 )             40.2     06-09    138  |  105  |  16  ----------------------------<  205<H>  3.7   |  21<L>  |  1.26    Ca    9.1      09 Jun 2021 06:38  Phos  2.9     06-09  Mg     1.9     06-09    TPro  6.6  /  Alb  3.6  /  TBili  0.5  /  DBili  x   /  AST  29  /  ALT  29  /  AlkPhos  78  06-09    PT/INR - ( 08 Jun 2021 02:20 )   PT: 12.6 sec;   INR: 1.11 ratio      PTT - ( 08 Jun 2021 09:34 )  PTT:72.2 sec  CARDIAC MARKERS ( 08 Jun 2021 09:34 )  x     / x     / 636 U/L / x     / 14.4 ng/mL  CARDIAC MARKERS ( 08 Jun 2021 01:19 )  x     / x     / 859 U/L / x     / 21.7 ng/mL    RADIOLOGY & ADDITIONAL TESTS:  Results Reviewed:   Imaging Personally Reviewed:  Electrocardiogram Personally Reviewed:    COORDINATION OF CARE:  Care Discussed with Consultants/Other Providers [Y/N]:  Prior or Outpatient Records Reviewed [Y/N]:

## 2021-06-10 NOTE — CHART NOTE - NSCHARTNOTEFT_GEN_A_CORE
Patient seen and examined on arrival to 6N post cath via RRA.   6/8 LHC: Prox and mid RCA of 99% s/p 2 resolute Peewee stent. EF of 45%.   R wrist dressing clean dry intact. No hematoma or bleeding noted.   Distal radial pulse 2+, hand warm and well perfused. No bruit heard.   Will monitor site tonight per protocol.  On ASA and plavix.  Patient currently stable, will continue to monitor.
Status post Cath, Right Radial site without bleeding or hematoma.  Dressing is intact.  Positive Pulses, Capillary refill less than two seconds.  Will continue to monitor.                                                                                                                                                  YAJAIRA Abbott, RPA-C [id number]
Pt seen right before d/c. Diabetes education provided.  Pt informed of goal fingersticks (100-80 mg/dL). Discussed carb sources and portions and importance of eating carbs with sufficient protein, fiber, and healthy fats. Avoid juice and soda. Pt understands and is able to teach back.

## 2021-06-15 PROBLEM — I10 ESSENTIAL (PRIMARY) HYPERTENSION: Chronic | Status: ACTIVE | Noted: 2021-06-08

## 2021-06-15 PROBLEM — E11.9 TYPE 2 DIABETES MELLITUS WITHOUT COMPLICATIONS: Chronic | Status: ACTIVE | Noted: 2021-06-08

## 2021-06-17 ENCOUNTER — NON-APPOINTMENT (OUTPATIENT)
Age: 71
End: 2021-06-17

## 2021-06-17 ENCOUNTER — APPOINTMENT (OUTPATIENT)
Dept: CARDIOLOGY | Facility: CLINIC | Age: 71
End: 2021-06-17
Payer: COMMERCIAL

## 2021-06-17 VITALS
DIASTOLIC BLOOD PRESSURE: 83 MMHG | BODY MASS INDEX: 28.79 KG/M2 | WEIGHT: 190 LBS | HEART RATE: 77 BPM | OXYGEN SATURATION: 97 % | TEMPERATURE: 98.5 F | SYSTOLIC BLOOD PRESSURE: 163 MMHG | HEIGHT: 68 IN

## 2021-06-17 PROCEDURE — 93000 ELECTROCARDIOGRAM COMPLETE: CPT

## 2021-06-17 PROCEDURE — 99214 OFFICE O/P EST MOD 30 MIN: CPT

## 2021-06-17 PROCEDURE — 99072 ADDL SUPL MATRL&STAF TM PHE: CPT

## 2021-06-17 RX ORDER — ASPIRIN 81 MG/1
81 TABLET ORAL DAILY
Qty: 30 | Refills: 3 | Status: ACTIVE | COMMUNITY
Start: 2021-06-17 | End: 1900-01-01

## 2021-06-17 RX ORDER — CLOPIDOGREL BISULFATE 75 MG/1
75 TABLET, FILM COATED ORAL DAILY
Qty: 90 | Refills: 3 | Status: ACTIVE | COMMUNITY
Start: 2021-06-17

## 2021-06-17 NOTE — DISCUSSION/SUMMARY
[FreeTextEntry1] : 71M with CAD/PCI, DM, HTN presents to establish cardiovascular care\par \par CAD/PCI\par -cp resolved since pci\par -cont asa plavix for one year course\par -cont losartan , toprol 25\par \par HTN\par -will increase losartan to 50 mg po qd\par \par f/u 6 months

## 2021-06-17 NOTE — REVIEW OF SYSTEMS
[Fever] : no fever [Headache] : no headache [Weight Gain (___ Lbs)] : no recent weight gain [Chills] : no chills [Feeling Fatigued] : not feeling fatigued [Weight Loss (___ Lbs)] : no recent weight loss [Blurry Vision] : no blurred vision [Sore Throat] : no sore throat [SOB] : no shortness of breath [Dyspnea on exertion] : not dyspnea during exertion [Chest Discomfort] : no chest discomfort [Lower Ext Edema] : no extremity edema [Palpitations] : no palpitations [Orthopnea] : no orthopnea [Syncope] : no syncope [Cough] : no cough [Wheezing] : no wheezing [Nausea] : no nausea [Vomiting] : no vomiting [Joint Pain] : no joint pain [Dizziness] : no dizziness [Confusion] : no confusion was observed [Easy Bleeding] : no tendency for easy bleeding [Easy Bruising] : no tendency for easy bruising

## 2021-06-17 NOTE — HISTORY OF PRESENT ILLNESS
[FreeTextEntry1] : 71M with CAD/PCI, DM, HTN presents to establish cardiovascular care\par Sent in by: recent [facility] discharge\par PMD:	\par \par pt presented to [facility] 6/7/21 with CP.  In ED EKG was NSR with no ischemic changes, Troponin 465 uptrending to 562,  with CK-MB 21.7. pt taken to the cath lab, found to have LVEF 45%, mLAD 30%, pRCA 70%, mRCA 99%. pt received 3.5 X 26 Resolute Rayo drug-eluting stent, and a 2.75 X 38 Resolute Dubois drug-eluting stent to the RCA.  and recommended to cont DAPT for 12 months. \par \par A TTE in the hospital revealed a LVEF 60% with Normal left ventricular systolic function and No segmental wall motion abnormalities. Normal right ventricular size and function.\par \par pt now presents to establish outpt cardiac care.\par \par today,\par pt feels well overall denies cp or sob, denies palpitations, syncope or dizziness.\par Right radial access site is clean, strong pulse\par states good compliance with all meds since he left the hospital\par \par \par \par \par \par \par \par Exercise: none\par Diet: none\par Prior cardiac workup: see above\par Recent labs:\par \par EKG: SR 73 bpm \par \par Med hx:\par Sx hx: none\par Fam hx: no known cardiac hx\par Social hx: lives in [place] with wife, works in [employer] (no heavy lifting) quit TOB 30 yrs ago. daily beer/liquor (2 beers or 2 shots), denies drugs\par Meds: asa plavix lipitor 80 toprol 25 losartan 25 metformin\par Allergies: nkda\par

## 2021-06-17 NOTE — CARDIOLOGY SUMMARY
[de-identified] : 6/9/21: \par Ejection Fraction (Teicholtz): 60 %\par ------------------------------------------------------------------------\par OBSERVATIONS:\par Mitral Valve: Mitral annular calcification, otherwise\par normal mitral valve. Minimal mitral regurgitation.\par Aortic Root: Normal aortic root.\par Aortic Valve: Calcified trileaflet aortic valve with normal\par opening.\par Left Atrium: Normal left atrium.\par Left Ventricle: Normal left ventricular systolic function.\par No segmental wall motion abnormalities. Normal left\par ventricular internal dimensions and wall thicknesses. Mild\par diastolic dysfunction (Stage I).\par Right Heart: Normal right atrium. Normal right ventricular\par size and function. Normal tricuspid valve.  Mild tricuspid\par regurgitation. Normal pulmonic valve.  Mild pulmonic\par regurgitation.\par Pericardium/PleuraNormal pericardium with no pericardial\par effusion. [de-identified] : 6/8/21:\par VENTRICLES: Analysis of regional contractile function demonstrated severe\par diaphragmatic hypokinesis. EF estimated was 45 %.\par CORONARY VESSELS: The coronary circulation is co-dominant.\par LM:   --  LM: Angiography showed mild atherosclerosis with no flow limiting\par lesions.\par LAD:   --  Mid LAD: There was a tubular 30 % stenosis at a site with no\par prior intervention. There was DORIS grade 3 flow through the vessel (brisk\par flow).\par --  D1: Angiography showed mild atherosclerosis with no flow limiting\par lesions.\par CX:   --  Circumflex: Angiography showed mild atherosclerosis with no flow\par limiting lesions.\par RCA:   --  Proximal RCA: There was a tubular 70 % stenosis at a site with\par no prior intervention. There was DORIS grade 2 flow through the vessel\par (partial perfusion). This is a likely culprit for the patient's clinical\par presentation. An intervention was performed.\par --  Mid RCA: There was a tubular 99 % stenosis at a site with no prior\par intervention. The lesion was associated with a small filling defect\par consistent with thrombus. There was DORIS grade 2 flow through the vessel\par (partial perfusion). This is a likely culprit for the patient's clinical\par presentation. An intervention was performed.\par COMPLICATIONS: There were no complications.\par DIAGNOSTIC IMPRESSIONS: Successful PCI to severe stenosis proximal and mid\par RCA using Pierz LYDIA (2stents) Mild cardiomyopathy.\par DIAGNOSTIC RECOMMENDATIONS: DAPT x 12 months\par

## 2021-06-17 NOTE — PHYSICAL EXAM
[Well Developed] : well developed [Well Nourished] : well nourished [No Acute Distress] : no acute distress [Normal Venous Pressure] : normal venous pressure [Normal S1, S2] : normal S1, S2 [No Murmur] : no murmur [Clear Lung Fields] : clear lung fields [Good Air Entry] : good air entry [No Respiratory Distress] : no respiratory distress  [Non Tender] : non-tender [Soft] : abdomen soft [Normal Gait] : normal gait [No Edema] : no edema [Moves all extremities] : moves all extremities [Alert and Oriented] : alert and oriented [No Focal Deficits] : no focal deficits

## 2021-10-14 ENCOUNTER — NON-APPOINTMENT (OUTPATIENT)
Age: 71
End: 2021-10-14

## 2021-10-14 ENCOUNTER — APPOINTMENT (OUTPATIENT)
Dept: CARDIOLOGY | Facility: CLINIC | Age: 71
End: 2021-10-14
Payer: COMMERCIAL

## 2021-10-14 VITALS
RESPIRATION RATE: 16 BRPM | OXYGEN SATURATION: 97 % | HEIGHT: 68 IN | TEMPERATURE: 98.2 F | BODY MASS INDEX: 28.71 KG/M2 | HEART RATE: 79 BPM | DIASTOLIC BLOOD PRESSURE: 90 MMHG | WEIGHT: 189.44 LBS | SYSTOLIC BLOOD PRESSURE: 174 MMHG

## 2021-10-14 PROCEDURE — 93000 ELECTROCARDIOGRAM COMPLETE: CPT

## 2021-10-14 PROCEDURE — 99215 OFFICE O/P EST HI 40 MIN: CPT

## 2021-10-14 NOTE — REVIEW OF SYSTEMS
[Hematuria] : hematuria [Fever] : no fever [Headache] : no headache [Weight Gain (___ Lbs)] : no recent weight gain [Chills] : no chills [Feeling Fatigued] : not feeling fatigued [Weight Loss (___ Lbs)] : no recent weight loss [Blurry Vision] : no blurred vision [Sore Throat] : no sore throat [SOB] : no shortness of breath [Dyspnea on exertion] : not dyspnea during exertion [Chest Discomfort] : no chest discomfort [Lower Ext Edema] : no extremity edema [Orthopnea] : no orthopnea [Syncope] : no syncope [Cough] : no cough [Wheezing] : no wheezing [Nausea] : no nausea [Vomiting] : no vomiting [Dizziness] : no dizziness [Confusion] : no confusion was observed

## 2021-10-14 NOTE — DISCUSSION/SUMMARY
[FreeTextEntry1] : 71M with CAD/PCI, DM, HTN presents for f/u\par \par 1. CAD/PCI\par -cp resolved since pci\par -cont asa plavix for one year course\par -cont losartan, toprol \par \par 2. HTN\par -remains elevated\par -will increase losartan to 100 mg po qd\par \par 3. pre-op eval for lithotripsy\par -pt denies cp or sob, at rest or on exertion. pt able to ambulate >1 block without symptoms\par -pt without severe valvular lesions on echo\par -pt denies known hx of VT/VF/sudden cardiac death\par -pt denies LE edema or orthopnea. \par -pt is intermediate risk for low risk procedure and may proceed without further cardiac testing\par -as pt is within several months of his PCI, Do not stop aspirin at all. \par -can temporarily hold plavix if surgeon requires but would restart it as soon as possible after the procedure. \par \par f/u after procedure for BP check\par \par

## 2021-10-14 NOTE — CARDIOLOGY SUMMARY
[de-identified] : 6/9/21: \par Ejection Fraction (Teicholtz): 60 %\par ------------------------------------------------------------------------\par OBSERVATIONS:\par Mitral Valve: Mitral annular calcification, otherwise\par normal mitral valve. Minimal mitral regurgitation.\par Aortic Root: Normal aortic root.\par Aortic Valve: Calcified trileaflet aortic valve with normal\par opening.\par Left Atrium: Normal left atrium.\par Left Ventricle: Normal left ventricular systolic function.\par No segmental wall motion abnormalities. Normal left\par ventricular internal dimensions and wall thicknesses. Mild\par diastolic dysfunction (Stage I).\par Right Heart: Normal right atrium. Normal right ventricular\par size and function. Normal tricuspid valve.  Mild tricuspid\par regurgitation. Normal pulmonic valve.  Mild pulmonic\par regurgitation.\par Pericardium/PleuraNormal pericardium with no pericardial\par effusion. [de-identified] : 6/8/21:\par VENTRICLES: Analysis of regional contractile function demonstrated severe\par diaphragmatic hypokinesis. EF estimated was 45 %.\par CORONARY VESSELS: The coronary circulation is co-dominant.\par LM:   --  LM: Angiography showed mild atherosclerosis with no flow limiting\par lesions.\par LAD:   --  Mid LAD: There was a tubular 30 % stenosis at a site with no\par prior intervention. There was DORIS grade 3 flow through the vessel (brisk\par flow).\par --  D1: Angiography showed mild atherosclerosis with no flow limiting\par lesions.\par CX:   --  Circumflex: Angiography showed mild atherosclerosis with no flow\par limiting lesions.\par RCA:   --  Proximal RCA: There was a tubular 70 % stenosis at a site with\par no prior intervention. There was DORIS grade 2 flow through the vessel\par (partial perfusion). This is a likely culprit for the patient's clinical\par presentation. An intervention was performed.\par --  Mid RCA: There was a tubular 99 % stenosis at a site with no prior\par intervention. The lesion was associated with a small filling defect\par consistent with thrombus. There was DORIS grade 2 flow through the vessel\par (partial perfusion). This is a likely culprit for the patient's clinical\par presentation. An intervention was performed.\par COMPLICATIONS: There were no complications.\par DIAGNOSTIC IMPRESSIONS: Successful PCI to severe stenosis proximal and mid\par RCA using Phoenix LYDIA (2stents) Mild cardiomyopathy.\par DIAGNOSTIC RECOMMENDATIONS: DAPT x 12 months\par

## 2021-10-14 NOTE — HISTORY OF PRESENT ILLNESS
[FreeTextEntry1] : 71M with CAD/PCI, DM, HTN presents for f/u\par PMD:	\par \par Previously,\par pt presented to [facility] 6/7/21 with CP. In ED EKG was NSR with no ischemic changes, Troponin 465 uptrending to 562,  with CK-MB 21.7. pt taken to the cath lab, found to have LVEF 45%, mLAD 30%, pRCA 70%, mRCA 99%. pt received 3.5 X 26 Resolute Rayo drug-eluting stent, and a 2.75 X 38 Resolute Rayo drug-eluting stent to the RCA. and recommended to cont DAPT for 12 months. \par A TTE in the hospital revealed a LVEF 60% with Normal left ventricular systolic function and No segmental wall motion abnormalities. Normal right ventricular size and function.\par Patient last seen here in cardiology for an initial visit in 6/21 following his discharge. at that time pt was feeling well, without complaints, taking dapt without any issues. \par \par pt now presents for follow up. \par \par pt recently found to have Urolithiasis after several months of hematuria, and no has plan for lithotripsy. The procedure will be done by Dr [facility], [facility].\par \par pt denies cp or sob, at rest or on exertion. pt able to ambulate >1 block without symptoms\par pt without severe valvular lesions on echo\par pt denies known hx of VT/VF/sudden cardiac death\par pt denies LE edema or orthopnea. \par \par \par Pt's urologist requesting pt to come off both asa and plavix prior to the procedure. 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 daily beer/liquor (2 beers or 2 shots), denies drugs\par Meds: asa plavix lipitor 80 toprol 25 losartan 25 metformin\par Allergies: nkda\par

## 2021-10-28 ENCOUNTER — APPOINTMENT (OUTPATIENT)
Dept: CARDIOLOGY | Facility: CLINIC | Age: 71
End: 2021-10-28
Payer: COMMERCIAL

## 2021-10-28 VITALS
SYSTOLIC BLOOD PRESSURE: 162 MMHG | OXYGEN SATURATION: 98 % | HEART RATE: 67 BPM | TEMPERATURE: 97.7 F | HEIGHT: 68 IN | DIASTOLIC BLOOD PRESSURE: 102 MMHG | WEIGHT: 193 LBS | BODY MASS INDEX: 29.25 KG/M2

## 2021-10-28 PROCEDURE — 99215 OFFICE O/P EST HI 40 MIN: CPT

## 2021-10-28 NOTE — HISTORY OF PRESENT ILLNESS
[FreeTextEntry1] : 71M with CAD/PCI, DM, HTN presents for f/u\par PMD:	\par \par Previously,\par pt presented to [facility] 6/7/21 with CP. In ED EKG was NSR with no ischemic changes, Troponin 465 uptrending to 562,  with CK-MB 21.7. pt taken to the cath lab, found to have LVEF 45%, mLAD 30%, pRCA 70%, mRCA 99%. pt received 3.5 X 26 Resolute Rayo drug-eluting stent, and a 2.75 X 38 Resolute Rayo drug-eluting stent to the RCA. and recommended to cont DAPT for 12 months. \par A TTE in the hospital revealed a LVEF 60% with Normal left ventricular systolic function and No segmental wall motion abnormalities. Normal right ventricular size and function.\par Patient last seen here in cardiology for an initial visit in 6/21 following his discharge. at that time pt was feeling well, without complaints, taking dapt without any issues. \par \par pt last seen in cardiology 10/14/21, at that time, pt feeling well overall, no CV symptoms. pt with new hematuria found to have nephrolithiasis, was planning on lithotripsy (Dr [facility] with [facility], at [facility]). pt was optimized for procedure.\par bp slightly elevated, losartan increased to 100, in addition to his toprol. \par \par pt now presents for follow up. \par \par pt has not yet had his lithotripsy. \par \par pt states full compliance with all meds: toprol 25 losartan 100. BP elevated today. pt does not check bp at home. pt denies cp sob HA, blurry vision, dizziness, syncope. \par \par \par Exercise: none\par Diet: none\par Prior cardiac workup: see above\par Recent labs:\par \par EKG: SR 70 bpm \par \par Med hx:\par Sx hx: none\par Fam hx: no known cardiac hx\par Social hx: lives in [place] with wife, works in [employer] (no heavy lifting) quit TOB 30 yrs ago. daily beer/liquor (2 beers or 2 shots), denies drugs\par Meds: asa plavix lipitor 80 toprol 25 losartan 100 metformin\par Allergies: nkda\par

## 2021-10-28 NOTE — DISCUSSION/SUMMARY
[FreeTextEntry1] : 71M with CAD/PCI, DM, HTN presents for f/u\par \par 1. CAD/PCI\par -cp resolved since pci\par -cont asa plavix for one year course\par -cont losartan, toprol \par \par 2. HTN\par -remains elevated\par -already taking losartan 100 toprol 25\par -will add norvasc 5\par \par 3. pre-op eval for lithotripsy\par -still has not had procedure. \par -pt denies cp or sob, at rest or on exertion. pt able to ambulate >1 block without symptoms\par -pt without severe valvular lesions on echo\par -pt denies known hx of VT/VF/sudden cardiac death\par -pt denies LE edema or orthopnea. \par -pt is intermediate risk for low risk procedure and may proceed without further cardiac testing\par -as pt is within several months of his PCI, Do not stop aspirin at all. \par -can temporarily hold plavix if surgeon requires but would restart it as soon as possible after the procedure. \par \par f/u 3 months\par \par

## 2022-01-20 ENCOUNTER — APPOINTMENT (OUTPATIENT)
Dept: CARDIOLOGY | Facility: CLINIC | Age: 72
End: 2022-01-20
Payer: MEDICARE

## 2022-02-03 ENCOUNTER — APPOINTMENT (OUTPATIENT)
Dept: CARDIOLOGY | Facility: CLINIC | Age: 72
End: 2022-02-03
Payer: MEDICARE

## 2022-02-10 ENCOUNTER — APPOINTMENT (OUTPATIENT)
Dept: CARDIOLOGY | Facility: CLINIC | Age: 72
End: 2022-02-10
Payer: MEDICARE

## 2022-02-10 VITALS
WEIGHT: 195.38 LBS | OXYGEN SATURATION: 95 % | TEMPERATURE: 98 F | BODY MASS INDEX: 29.61 KG/M2 | HEART RATE: 84 BPM | SYSTOLIC BLOOD PRESSURE: 184 MMHG | HEIGHT: 68 IN | DIASTOLIC BLOOD PRESSURE: 90 MMHG

## 2022-02-10 PROCEDURE — 99214 OFFICE O/P EST MOD 30 MIN: CPT

## 2022-02-10 NOTE — HISTORY OF PRESENT ILLNESS
[FreeTextEntry1] : 71M with CAD/PCI, DM, HTN presents for f/u\par PMD:	\par \par Previously,\par pt presented to [facility] 6/7/21 with CP, and elevated Morgan.found to have LVEF 45%, mLAD 30%, pRCA 70%, mRCA 99%. pt received 3.5 X 26 Resolute Rayo drug-eluting stent, and a 2.75 X 38 Resolute Rayo drug-eluting stent to the RCA. and recommended to cont DAPT for 12 months. \par A TTE in the hospital revealed a LVEF 60% with Normal left ventricular systolic function and No segmental wall motion abnormalities. Normal right ventricular size and function.\par Patient seen here in cardiology for an initial visit in 6/21 following his discharge. at that time pt was feeling well, without complaints, taking dapt without any issues. \par \par pt last seen in cardiology 10/28/21, at that time, pt feeling well overall, no CV symptoms. pt with new hematuria found to have nephrolithiasis, was planning on lithotripsy (Dr [facility] with [facility], [facility]). pt was optimized for procedure.\par bp slightly elevated, norvasc 5 added to his regimen of losartan 100, toprol 25. \par \par pt now presents for follow up. \par today,\par \par pt never had his lithotripsy, states he doctor kept changing the date of the procedure and he became frustrated, and also states he has been feeling well.  \par \par pt has been checking bp at home, states it can get up to the 140-160s. \par \par pt states full compliance with all meds: toprol 25 losartan 100. norvasc 5.  pt denies cp sob HA, blurry vision, dizziness, syncope. \par \par pt without complaints today. \par \par \par Exercise: none\par Diet: none\par Prior cardiac workup: see above\par Recent labs:\par \par EKG: SR 70 bpm \par \par Med hx:\par Sx hx: none\par Fam hx: no known cardiac hx\par Social hx: lives in [place] with wife, works in [employer] (no heavy lifting) quit TOB 30 yrs ago. daily beer/liquor (2 beers or 2 shots), denies drugs\par Meds: asa plavix lipitor 80 toprol 25 losartan 100 metformin norvasc 5\par Allergies: nkda\par

## 2022-02-10 NOTE — DISCUSSION/SUMMARY
[FreeTextEntry1] : 71M with CAD/PCI, DM, HTN presents for f/u\par \par 1. CAD/PCI\par -cp resolved since pci\par -cont asa plavix for one year course\par -cont losartan, toprol \par \par 2. HTN\par -remains elevated\par -cont losartan 100 toprol 25\par -will increase norvasc from 5 to 10\par \par \par f/u 1 month\par \par

## 2022-03-11 ENCOUNTER — APPOINTMENT (OUTPATIENT)
Dept: CARDIOLOGY | Facility: CLINIC | Age: 72
End: 2022-03-11
Payer: MEDICARE

## 2022-03-11 VITALS
HEART RATE: 85 BPM | WEIGHT: 195 LBS | OXYGEN SATURATION: 95 % | SYSTOLIC BLOOD PRESSURE: 126 MMHG | DIASTOLIC BLOOD PRESSURE: 76 MMHG | BODY MASS INDEX: 29.55 KG/M2 | HEIGHT: 68 IN | TEMPERATURE: 98.5 F

## 2022-03-11 PROCEDURE — 99215 OFFICE O/P EST HI 40 MIN: CPT

## 2022-03-11 NOTE — DISCUSSION/SUMMARY
[FreeTextEntry1] : 71M with CAD/PCI, DM, HTN presents for f/u\par \par 1. CAD/PCI\par -cp resolved since pci\par -cont asa plavix for one year course\par -cont losartan, toprol \par \par 2. HTN\par -improved on current regimen\par -cont losartan 100 toprol 25 norvasc 10\par \par \par \par f/u 4 months or sooner if required\par \par

## 2022-03-11 NOTE — HISTORY OF PRESENT ILLNESS
[FreeTextEntry1] : 71M with CAD/PCI, DM, HTN presents for f/u\par PMD:	\par \par Previously,\par pt presented to [facility] 6/7/21 with CP, and elevated Morgan.found to have LVEF 45%, mLAD 30%, pRCA 70%, mRCA 99%. pt received 3.5 X 26 Resolute McIntosh drug-eluting stent, and a 2.75 X 38 Resolute Rayo drug-eluting stent to the RCA. and recommended to cont DAPT for 12 months. \par A TTE in the hospital revealed a LVEF 60% with Normal left ventricular systolic function and No segmental wall motion abnormalities. Normal right ventricular size and function.\par Patient seen here in cardiology for an initial visit in 6/21 following his discharge. at that time pt was feeling well, without complaints, taking dapt without any issues. \par \par pt last seen in cardiology 2/22, at that time, pt feeling well overall, no CV symptoms. pt with hematuria 2/2  nephrolithiasis, was planning on lithotripsy (Dr [facility], [facility]).but did not yet have it done. \par BP elevated on losartan 100 toprol 25. norvasc increased from 5 to 10. \par \par pt now presents for follow up. \par today,\par \par \par pt states full compliance with bp meds, states he checks his bp a home, and it has been improved over the past few weeks. 130s systolic mostly. \par pt denies HA, blurry vision, cp, sob. \par \par still has going for lithotripsy. going to follow up with his urologist next week. \par \par pt states full compliance with all meds: toprol 25 losartan 100. norvasc 10. \par \par pt without complaints today. 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 daily beer/liquor (2 beers or 2 shots), denies drugs\par Meds: asa plavix lipitor 80 toprol 25 losartan 100 metformin norvasc 10\par Allergies: nkda\par

## 2022-03-11 NOTE — REVIEW OF SYSTEMS
Orders placed today.  Nurse writer TORSTEN to return my call today.     Please do not eat any food or drink any beverages for 10-12 hours before having the testing done. You may have black coffee or water only; NO candy, gum, mints, soda or juice. Please take all medications as usual. Do not drink any alcoholic beverages for 24 hours prior to testing.      [Fever] : no fever [Headache] : no headache [Weight Gain (___ Lbs)] : no recent weight gain [Chills] : no chills [Feeling Fatigued] : not feeling fatigued [Weight Loss (___ Lbs)] : no recent weight loss [Blurry Vision] : no blurred vision [Sore Throat] : no sore throat [SOB] : no shortness of breath [Dyspnea on exertion] : not dyspnea during exertion [Chest Discomfort] : no chest discomfort [Lower Ext Edema] : no extremity edema [Palpitations] : no palpitations [Orthopnea] : no orthopnea [Syncope] : no syncope [Cough] : no cough [Wheezing] : no wheezing [Nausea] : no nausea [Vomiting] : no vomiting [Dizziness] : no dizziness [Confusion] : no confusion was observed [Easy Bleeding] : no tendency for easy bleeding [Easy Bruising] : no tendency for easy bruising

## 2022-07-21 ENCOUNTER — APPOINTMENT (OUTPATIENT)
Dept: CARDIOLOGY | Facility: CLINIC | Age: 72
End: 2022-07-21

## 2022-08-01 ENCOUNTER — EMERGENCY (EMERGENCY)
Facility: HOSPITAL | Age: 72
LOS: 1 days | Discharge: HOME | End: 2022-08-01
Attending: EMERGENCY MEDICINE | Admitting: EMERGENCY MEDICINE

## 2022-08-01 VITALS
OXYGEN SATURATION: 100 % | TEMPERATURE: 101 F | HEART RATE: 95 BPM | RESPIRATION RATE: 18 BRPM | SYSTOLIC BLOOD PRESSURE: 155 MMHG | DIASTOLIC BLOOD PRESSURE: 70 MMHG

## 2022-08-01 LAB
APTT BLD: 30.7 SEC — SIGNIFICANT CHANGE UP (ref 27–36.3)
BASE EXCESS BLDV CALC-SCNC: 2.2 MMOL/L — SIGNIFICANT CHANGE UP (ref -2–3)
BASOPHILS # BLD AUTO: 0.03 K/UL — SIGNIFICANT CHANGE UP (ref 0–0.2)
BASOPHILS NFR BLD AUTO: 0.4 % — SIGNIFICANT CHANGE UP (ref 0–2)
BLOOD GAS VENOUS COMPREHENSIVE RESULT: SIGNIFICANT CHANGE UP
CHLORIDE BLDV-SCNC: 102 MMOL/L — SIGNIFICANT CHANGE UP (ref 96–108)
CO2 BLDV-SCNC: 27.7 MMOL/L — HIGH (ref 22–26)
EOSINOPHIL # BLD AUTO: 0.02 K/UL — SIGNIFICANT CHANGE UP (ref 0–0.5)
EOSINOPHIL NFR BLD AUTO: 0.3 % — SIGNIFICANT CHANGE UP (ref 0–6)
GAS PNL BLDV: 134 MMOL/L — LOW (ref 136–145)
GLUCOSE BLDV-MCNC: 136 MG/DL — HIGH (ref 70–99)
HCO3 BLDV-SCNC: 26 MMOL/L — SIGNIFICANT CHANGE UP (ref 22–29)
HCT VFR BLD CALC: 35 % — LOW (ref 39–50)
HCT VFR BLDA CALC: 36 % — LOW (ref 39–51)
HGB BLD CALC-MCNC: 12 G/DL — LOW (ref 13–17)
HGB BLD-MCNC: 11.3 G/DL — LOW (ref 13–17)
IANC: 4.79 K/UL — SIGNIFICANT CHANGE UP (ref 1.8–7.4)
IMM GRANULOCYTES NFR BLD AUTO: 0.3 % — SIGNIFICANT CHANGE UP (ref 0–1.5)
LACTATE BLDV-MCNC: 0.8 MMOL/L — SIGNIFICANT CHANGE UP (ref 0.5–2)
LYMPHOCYTES # BLD AUTO: 1.66 K/UL — SIGNIFICANT CHANGE UP (ref 1–3.3)
LYMPHOCYTES # BLD AUTO: 21.7 % — SIGNIFICANT CHANGE UP (ref 13–44)
MCHC RBC-ENTMCNC: 30.1 PG — SIGNIFICANT CHANGE UP (ref 27–34)
MCHC RBC-ENTMCNC: 32.3 GM/DL — SIGNIFICANT CHANGE UP (ref 32–36)
MCV RBC AUTO: 93.1 FL — SIGNIFICANT CHANGE UP (ref 80–100)
MONOCYTES # BLD AUTO: 1.12 K/UL — HIGH (ref 0–0.9)
MONOCYTES NFR BLD AUTO: 14.7 % — HIGH (ref 2–14)
NEUTROPHILS # BLD AUTO: 4.79 K/UL — SIGNIFICANT CHANGE UP (ref 1.8–7.4)
NEUTROPHILS NFR BLD AUTO: 62.6 % — SIGNIFICANT CHANGE UP (ref 43–77)
NRBC # BLD: 0 /100 WBCS — SIGNIFICANT CHANGE UP
NRBC # FLD: 0 K/UL — SIGNIFICANT CHANGE UP
PCO2 BLDV: 39 MMHG — LOW (ref 42–55)
PH BLDV: 7.44 — HIGH (ref 7.32–7.43)
PLATELET # BLD AUTO: 134 K/UL — LOW (ref 150–400)
PO2 BLDV: 59 MMHG — SIGNIFICANT CHANGE UP
POTASSIUM BLDV-SCNC: 4.2 MMOL/L — SIGNIFICANT CHANGE UP (ref 3.5–5.1)
RBC # BLD: 3.76 M/UL — LOW (ref 4.2–5.8)
RBC # FLD: 12.3 % — SIGNIFICANT CHANGE UP (ref 10.3–14.5)
SAO2 % BLDV: 90.1 % — SIGNIFICANT CHANGE UP
WBC # BLD: 7.64 K/UL — SIGNIFICANT CHANGE UP (ref 3.8–10.5)
WBC # FLD AUTO: 7.64 K/UL — SIGNIFICANT CHANGE UP (ref 3.8–10.5)

## 2022-08-01 PROCEDURE — 99285 EMERGENCY DEPT VISIT HI MDM: CPT

## 2022-08-01 PROCEDURE — 99053 MED SERV 10PM-8AM 24 HR FAC: CPT

## 2022-08-01 PROCEDURE — 71046 X-RAY EXAM CHEST 2 VIEWS: CPT | Mod: 26

## 2022-08-01 RX ORDER — ACETAMINOPHEN 500 MG
975 TABLET ORAL ONCE
Refills: 0 | Status: COMPLETED | OUTPATIENT
Start: 2022-08-01 | End: 2022-08-01

## 2022-08-01 RX ADMIN — Medication 975 MILLIGRAM(S): at 23:27

## 2022-08-01 NOTE — ED ADULT NURSE NOTE - NSFALLRSKINDICATORS_ED_ALL_ED
AUDIOLOGY REPORT    SUMMARY: Audiology visit completed. See audiogram for results.      RECOMMENDATIONS: Follow-up with ENT.        Chi Acuna  Audiologist  [place] License  #[id number]    
no

## 2022-08-01 NOTE — ED ADULT TRIAGE NOTE - CHIEF COMPLAINT QUOTE
c/o fevers/chills x2weeks. Also endorses a mechanical fall 2 weeks ago, where pt hit back of head on night stand. Since then has been experiencing intermittent head pain and neck stiffness. Hx Dm (127), HTN, HLD

## 2022-08-01 NOTE — ED ADULT NURSE NOTE - OBJECTIVE STATEMENT
Flavia RN: 72 year old male received to rm 12 AAOx4 ambulatory. Pt c/o intermittent fevers x2 wks and s/p mechanical fall 4 days ago with head strike to posterior head. Pt denies LOC. Pt on Plavix. Pt endorses intermittent headaches and neck stiffness. Pt took 1g Tylenol today @2100. Fevers at home 101F. Pt afebrile at this time. Pt denies dizziness, cp, sob, n/v/d. Respirations even and unlabored sating 98% on room air. NSR on cardiac monitor. PIV #18 left forearm, labs drawn and sent. VS as noted. Medicated pt per MD orders. Bed in lowest position, call bell within reach. Report given to primary RN

## 2022-08-02 VITALS
DIASTOLIC BLOOD PRESSURE: 84 MMHG | HEART RATE: 71 BPM | TEMPERATURE: 98 F | OXYGEN SATURATION: 100 % | SYSTOLIC BLOOD PRESSURE: 133 MMHG | RESPIRATION RATE: 18 BRPM

## 2022-08-02 LAB
ALBUMIN SERPL ELPH-MCNC: 4.1 G/DL — SIGNIFICANT CHANGE UP (ref 3.3–5)
ALP SERPL-CCNC: 127 U/L — HIGH (ref 40–120)
ALT FLD-CCNC: 53 U/L — HIGH (ref 4–41)
ANION GAP SERPL CALC-SCNC: 12 MMOL/L — SIGNIFICANT CHANGE UP (ref 7–14)
AST SERPL-CCNC: 45 U/L — HIGH (ref 4–40)
BILIRUB SERPL-MCNC: 0.8 MG/DL — SIGNIFICANT CHANGE UP (ref 0.2–1.2)
BUN SERPL-MCNC: 19 MG/DL — SIGNIFICANT CHANGE UP (ref 7–23)
CALCIUM SERPL-MCNC: 9.2 MG/DL — SIGNIFICANT CHANGE UP (ref 8.4–10.5)
CHLORIDE SERPL-SCNC: 100 MMOL/L — SIGNIFICANT CHANGE UP (ref 98–107)
CO2 SERPL-SCNC: 23 MMOL/L — SIGNIFICANT CHANGE UP (ref 22–31)
CREAT SERPL-MCNC: 1.7 MG/DL — HIGH (ref 0.5–1.3)
EGFR: 43 ML/MIN/1.73M2 — LOW
FLUAV AG NPH QL: SIGNIFICANT CHANGE UP
FLUBV AG NPH QL: SIGNIFICANT CHANGE UP
GLUCOSE SERPL-MCNC: 140 MG/DL — HIGH (ref 70–99)
POTASSIUM SERPL-MCNC: 4.1 MMOL/L — SIGNIFICANT CHANGE UP (ref 3.5–5.3)
POTASSIUM SERPL-SCNC: 4.1 MMOL/L — SIGNIFICANT CHANGE UP (ref 3.5–5.3)
PROT SERPL-MCNC: 7.7 G/DL — SIGNIFICANT CHANGE UP (ref 6–8.3)
RSV RNA NPH QL NAA+NON-PROBE: SIGNIFICANT CHANGE UP
SARS-COV-2 RNA SPEC QL NAA+PROBE: SIGNIFICANT CHANGE UP
SODIUM SERPL-SCNC: 135 MMOL/L — SIGNIFICANT CHANGE UP (ref 135–145)

## 2022-08-02 PROCEDURE — 72125 CT NECK SPINE W/O DYE: CPT | Mod: 26,MA

## 2022-08-02 PROCEDURE — 70450 CT HEAD/BRAIN W/O DYE: CPT | Mod: 26,MA

## 2022-08-02 RX ORDER — SODIUM CHLORIDE 9 MG/ML
1000 INJECTION INTRAMUSCULAR; INTRAVENOUS; SUBCUTANEOUS ONCE
Refills: 0 | Status: COMPLETED | OUTPATIENT
Start: 2022-08-02 | End: 2022-08-02

## 2022-08-02 RX ADMIN — SODIUM CHLORIDE 1000 MILLILITER(S): 9 INJECTION INTRAMUSCULAR; INTRAVENOUS; SUBCUTANEOUS at 01:26

## 2022-08-02 RX ADMIN — Medication 975 MILLIGRAM(S): at 00:30

## 2022-08-02 NOTE — ED PROVIDER NOTE - CLINICAL SUMMARY MEDICAL DECISION MAKING FREE TEXT BOX
71 yo M with hx of HTN, DM, CAD with stent on ASA/Plavix presenting with fevers x 3 days. +decreased appetite. Fall with head strike 2 weeks ago with persistent diffuse head and neck pain. Patient nontoxic appearing, clear lungs, abdomen soft/nontender. No sick contacts, recent travel. Febrile to 101 here. Suspect viral syndrome with concomitant recent head trauma. Doubt bacterial infection. Plan for labs, CT head/neck, pain control and dispo pending results.

## 2022-08-02 NOTE — ED PROVIDER NOTE - ATTENDING CONTRIBUTION TO CARE
73 yo with PMH HTN CAD DM presenting with 3 days of intermittent fevers to 101.  The improve with tylenol.  There is an associated anorexia but no SOB, CP abd pain NVD cough or urinary complaints and no sick contacts.    Also had a fall from bed two weeks ago with resultant intermittent HAs and some L sided neck pain that is worse with moving.    Vitals: I have reviewed the patients vital signs  General: nontoxic appearing  HEENT: Atraumatic, normocephalic, airway patent  Eyes: EOMI, tracking appropriately  Neck: no tracheal deviation full ROM  Chest/Lungs: no trauma, symmetric chest rise, speaking in complete sentences,  no resp distress  Heart: skin and extremities well perfused, regular rate and rhythm  Neuro: A+Ox3, ambulating without difficulty, appears non focal  MSK: no deformities  Skin: no cyanosis, no jaundice     73 yo with 3 days of fever.  No other symptoms and no good source.  Will look for COVID or other virus.  CxR and screening labs.  Otherwise appears well with good VS except for the fever.  Will also get Head CT to eval for SDH of cervical fracture based on age and continued symptoms after fall even through pretest probability is low.

## 2022-08-02 NOTE — ED PROVIDER NOTE - PHYSICAL EXAMINATION
Gen: NAD, AAOx3, non-toxic appearing  HEENT: NCAT, normal conjunctiva, oral mucosa moist  Lung: speaking in full sentences, good aeration bilaterally, lungs CTA b/l  CV: regular rate and rhythm. cap refill <2x. peripheral pulses 2+bilaterally   Abd: soft, ND, NT  MSK: no visible deformities, no midline spinal tenderness, L sided neck hypertonicity with difficulty turning to the L due to pain  Neuro: No focal deficits  Skin: Intact  Psych: normal affect

## 2022-08-02 NOTE — ED PROVIDER NOTE - NSFOLLOWUPINSTRUCTIONS_ED_ALL_ED_FT
You were seen in the Emergency Department for FEVERS and HEAD INJURY.    For pain or fever you can take ibuprofen (Motrin, Advil) or acetaminophen (Tylenol) as needed, as directed on packaging.     Continue to take your medications as prescribed.    If you have persistent high fever, chills, nausea, vomiting, new or worsening pain, or if you have any new symptoms return to the Emergency Department.

## 2022-08-02 NOTE — ED PROVIDER NOTE - PROGRESS NOTE DETAILS
Lucia, PGY2: Patient reevaluated, asymptomatic at this time. Discussed lab and radiology findings with patient. Patient feels comfortable going home. Gave home care and follow up instructions. Discussed which symptoms to look out for and when to return to the ED for further evaluation. Patient given opportunity to ask questions about their medical condition and had all questions answered.

## 2022-08-02 NOTE — ED PROVIDER NOTE - NS ED ROS FT
Constitutional:  (+) fever, (-) chills, (-) fatigue  Eyes:  (-) eye pain (-) visual changes  ENMT: (-) nasal discharge, (-) sore throat. (-) neck pain or stiffness  Cardiac: (-) chest pain (-) palpitations  Respiratory:  (-) cough (-) shortness of breath  GI:  (-) nausea (-) vomiting (-) diarrhea (-) abdominal pain  :  (-) dysuria (-) frequency (-) burning  MS:  (-) back pain (-) joint pain  Neuro:  (+) headache (-) numbness (-) tingling (-) focal weakness  Skin:  (-) rash  Except as documented in the HPI,  all other systems are negative

## 2022-08-02 NOTE — ED PROVIDER NOTE - OBJECTIVE STATEMENT
71 yo M with hx of HTN, DM, CAD with stent presenting with fevers x 3 days. Patient reports tmax 101 at home, taking Tylenol as needed. Reports decreased appetite. Denies cough, shortness of breath, abd pain, urinary symptoms. Reports falling out of bed 2 week agos, hitting his head. Reports diffuse headache and L sided neck pain worsening since the fall. Denies focal weakness, nausea, vomiting, changes in vision.

## 2022-08-02 NOTE — ED ADULT NURSE REASSESSMENT NOTE - NS ED NURSE REASSESS COMMENT FT1
BREAK RN: Patient returns from CT scan at this time. Pt resting comfortably at this time. Pt offering no complaints and remains in no acute distress. Respirations even and unlabored. Vitals per flowsheet. Callbell remains in reach. Awaiting CT read.
Received report from Flavia RN: pt resting comfortably in stretcher. Pt is currently afebrile at this time. Respirations are even and unlabored

## 2022-08-02 NOTE — ED PROVIDER NOTE - NSICDXNOPASTSURGICALHX_GEN_ALL_ED
PA received from [employer] for Brand Name Adderall, Form on PCP's Desk   <-- Click to add NO significant Past Surgical History

## 2022-08-02 NOTE — ED PROVIDER NOTE - PATIENT PORTAL LINK FT
You can access the FollowMyHealth Patient Portal offered by [facility] by registering at the following website: [email or web address]. By joining [employer]’s FollowMyHealth portal, you will also be able to view your health information using other applications (apps) compatible with our system.

## 2022-08-04 ENCOUNTER — APPOINTMENT (OUTPATIENT)
Dept: CARDIOLOGY | Facility: CLINIC | Age: 72
End: 2022-08-04

## 2022-08-06 ENCOUNTER — EMERGENCY (EMERGENCY)
Facility: HOSPITAL | Age: 72
LOS: 1 days | Discharge: HOME | End: 2022-08-06
Attending: EMERGENCY MEDICINE | Admitting: EMERGENCY MEDICINE

## 2022-08-06 VITALS
OXYGEN SATURATION: 100 % | SYSTOLIC BLOOD PRESSURE: 129 MMHG | DIASTOLIC BLOOD PRESSURE: 64 MMHG | HEART RATE: 76 BPM | RESPIRATION RATE: 18 BRPM | TEMPERATURE: 98 F

## 2022-08-06 PROCEDURE — 99053 MED SERV 10PM-8AM 24 HR FAC: CPT

## 2022-08-06 PROCEDURE — 99284 EMERGENCY DEPT VISIT MOD MDM: CPT

## 2022-08-06 NOTE — ED ADULT TRIAGE NOTE - CHIEF COMPLAINT QUOTE
c/o testicular pain and swelling reports onset  this mornings states was seen in ER on Monday for fevers. denies any urinary complaints. endorses continued fevers and chills, hx of DM, HTN. high Cholesterol

## 2022-08-07 VITALS
DIASTOLIC BLOOD PRESSURE: 80 MMHG | HEART RATE: 76 BPM | TEMPERATURE: 98 F | OXYGEN SATURATION: 98 % | SYSTOLIC BLOOD PRESSURE: 131 MMHG | RESPIRATION RATE: 18 BRPM

## 2022-08-07 LAB
ALBUMIN SERPL ELPH-MCNC: 3.8 G/DL — SIGNIFICANT CHANGE UP (ref 3.3–5)
ALP SERPL-CCNC: 186 U/L — HIGH (ref 40–120)
ALT FLD-CCNC: 74 U/L — HIGH (ref 4–41)
ANION GAP SERPL CALC-SCNC: 10 MMOL/L — SIGNIFICANT CHANGE UP (ref 7–14)
APPEARANCE UR: ABNORMAL
AST SERPL-CCNC: 63 U/L — HIGH (ref 4–40)
BACTERIA # UR AUTO: ABNORMAL
BASOPHILS # BLD AUTO: 0.05 K/UL — SIGNIFICANT CHANGE UP (ref 0–0.2)
BASOPHILS NFR BLD AUTO: 0.7 % — SIGNIFICANT CHANGE UP (ref 0–2)
BILIRUB SERPL-MCNC: 0.3 MG/DL — SIGNIFICANT CHANGE UP (ref 0.2–1.2)
BILIRUB UR-MCNC: NEGATIVE — SIGNIFICANT CHANGE UP
BUN SERPL-MCNC: 27 MG/DL — HIGH (ref 7–23)
CALCIUM SERPL-MCNC: 9.8 MG/DL — SIGNIFICANT CHANGE UP (ref 8.4–10.5)
CHLORIDE SERPL-SCNC: 104 MMOL/L — SIGNIFICANT CHANGE UP (ref 98–107)
CO2 SERPL-SCNC: 23 MMOL/L — SIGNIFICANT CHANGE UP (ref 22–31)
COLOR SPEC: YELLOW — SIGNIFICANT CHANGE UP
CREAT SERPL-MCNC: 1.66 MG/DL — HIGH (ref 0.5–1.3)
DIFF PNL FLD: ABNORMAL
EGFR: 44 ML/MIN/1.73M2 — LOW
EOSINOPHIL # BLD AUTO: 0.19 K/UL — SIGNIFICANT CHANGE UP (ref 0–0.5)
EOSINOPHIL NFR BLD AUTO: 2.7 % — SIGNIFICANT CHANGE UP (ref 0–6)
EPI CELLS # UR: 6 /HPF — HIGH (ref 0–5)
FLUAV AG NPH QL: SIGNIFICANT CHANGE UP
FLUBV AG NPH QL: SIGNIFICANT CHANGE UP
GLUCOSE SERPL-MCNC: 128 MG/DL — HIGH (ref 70–99)
GLUCOSE UR QL: NEGATIVE — SIGNIFICANT CHANGE UP
HCT VFR BLD CALC: 34.2 % — LOW (ref 39–50)
HGB BLD-MCNC: 10.6 G/DL — LOW (ref 13–17)
HYALINE CASTS # UR AUTO: 1 /LPF — SIGNIFICANT CHANGE UP (ref 0–7)
IANC: 4.32 K/UL — SIGNIFICANT CHANGE UP (ref 1.8–7.4)
IMM GRANULOCYTES NFR BLD AUTO: 0.6 % — SIGNIFICANT CHANGE UP (ref 0–1.5)
KETONES UR-MCNC: NEGATIVE — SIGNIFICANT CHANGE UP
LEUKOCYTE ESTERASE UR-ACNC: ABNORMAL
LYMPHOCYTES # BLD AUTO: 1.53 K/UL — SIGNIFICANT CHANGE UP (ref 1–3.3)
LYMPHOCYTES # BLD AUTO: 21.8 % — SIGNIFICANT CHANGE UP (ref 13–44)
MCHC RBC-ENTMCNC: 29.9 PG — SIGNIFICANT CHANGE UP (ref 27–34)
MCHC RBC-ENTMCNC: 31 GM/DL — LOW (ref 32–36)
MCV RBC AUTO: 96.6 FL — SIGNIFICANT CHANGE UP (ref 80–100)
MONOCYTES # BLD AUTO: 0.89 K/UL — SIGNIFICANT CHANGE UP (ref 0–0.9)
MONOCYTES NFR BLD AUTO: 12.7 % — SIGNIFICANT CHANGE UP (ref 2–14)
NEUTROPHILS # BLD AUTO: 4.32 K/UL — SIGNIFICANT CHANGE UP (ref 1.8–7.4)
NEUTROPHILS NFR BLD AUTO: 61.5 % — SIGNIFICANT CHANGE UP (ref 43–77)
NITRITE UR-MCNC: NEGATIVE — SIGNIFICANT CHANGE UP
NRBC # BLD: 0 /100 WBCS — SIGNIFICANT CHANGE UP
NRBC # FLD: 0 K/UL — SIGNIFICANT CHANGE UP
PH UR: 6 — SIGNIFICANT CHANGE UP (ref 5–8)
PLATELET # BLD AUTO: 217 K/UL — SIGNIFICANT CHANGE UP (ref 150–400)
POTASSIUM SERPL-MCNC: 5.1 MMOL/L — SIGNIFICANT CHANGE UP (ref 3.5–5.3)
POTASSIUM SERPL-SCNC: 5.1 MMOL/L — SIGNIFICANT CHANGE UP (ref 3.5–5.3)
PROT SERPL-MCNC: 7.6 G/DL — SIGNIFICANT CHANGE UP (ref 6–8.3)
PROT UR-MCNC: ABNORMAL
RBC # BLD: 3.54 M/UL — LOW (ref 4.2–5.8)
RBC # FLD: 13 % — SIGNIFICANT CHANGE UP (ref 10.3–14.5)
RBC CASTS # UR COMP ASSIST: 237 /HPF — HIGH (ref 0–4)
RSV RNA NPH QL NAA+NON-PROBE: SIGNIFICANT CHANGE UP
SARS-COV-2 RNA SPEC QL NAA+PROBE: SIGNIFICANT CHANGE UP
SODIUM SERPL-SCNC: 137 MMOL/L — SIGNIFICANT CHANGE UP (ref 135–145)
SP GR SPEC: 1.02 — SIGNIFICANT CHANGE UP (ref 1–1.05)
UROBILINOGEN FLD QL: SIGNIFICANT CHANGE UP
WBC # BLD: 7.02 K/UL — SIGNIFICANT CHANGE UP (ref 3.8–10.5)
WBC # FLD AUTO: 7.02 K/UL — SIGNIFICANT CHANGE UP (ref 3.8–10.5)
WBC UR QL: 56 /HPF — HIGH (ref 0–5)

## 2022-08-07 PROCEDURE — 76870 US EXAM SCROTUM: CPT | Mod: 26

## 2022-08-07 RX ORDER — CEFPODOXIME PROXETIL 100 MG
1 TABLET ORAL
Qty: 28 | Refills: 0
Start: 2022-08-07 | End: 2022-08-20

## 2022-08-07 RX ORDER — CEFTRIAXONE 500 MG/1
1000 INJECTION, POWDER, FOR SOLUTION INTRAMUSCULAR; INTRAVENOUS ONCE
Refills: 0 | Status: COMPLETED | OUTPATIENT
Start: 2022-08-07 | End: 2022-08-07

## 2022-08-07 RX ADMIN — CEFTRIAXONE 100 MILLIGRAM(S): 500 INJECTION, POWDER, FOR SOLUTION INTRAMUSCULAR; INTRAVENOUS at 03:39

## 2022-08-07 NOTE — ED PROVIDER NOTE - OBJECTIVE STATEMENT
72 year old male with a PMHx of HTN, DM, CAD with stent presenting with right testicular swelling. Patient has been having intermittent fevers since Monday. Patient noticed his right testicle was swollen on Wednesday. Evaluated by Urgent care on Wednesday and started on bactrim. Still having intermittent fevers. Denies pain in the right testicle, hematuria, dysuria, sob, chest pain, abdominal pain, nausea, vomiting.

## 2022-08-07 NOTE — ED PROVIDER NOTE - ATTENDING CONTRIBUTION TO CARE
I performed a face-to-face evaluation of the patient and performed a history and physical examination. I agree with the history and physical examination. If this was a PA visit, I personally saw the patient with the PA and performed a substantive portion of the visit including all aspects of the medical decision making.    R testicle swelling and F x 5 days. On urgent care-prescribed Bactrim. Still w/ F. Check UA/Cx and US (? epididymitis/orchitis).

## 2022-08-07 NOTE — ED PROVIDER NOTE - NSDCPRINTRESULTS_ED_ALL_ED
Patient seen for paranoia, disorganized behavior. Chart reviewed. No significant interval events. Complaining that his room is warm at night (same issue; chronic) and requested 2 ice packs for bedtime. Same clinical presentation as usual. Patient requests all Lab, Cardiology, and Radiology Results on their Discharge Instructions

## 2022-08-07 NOTE — ED PROVIDER NOTE - PHYSICAL EXAMINATION
gen: well appearing  Mentation: AAO x 3  psych: mood appropriate  HEENT: airway patent, conjunctivae clear bilaterally  Cardio: RRR, no m/r/g  Resp: normal BS b/l  GI: soft/nondistended/nontender  : Right testicular swelling without tenderness, cremasteric reflex intact bilaterally (Chaperoned by Debra Santiago RN)  Neuro: sensation and motor function grossly intact  Skin: No evidence of rash  MSK: normal movement of all extremities  Lymph/Vasc: no LE edema

## 2022-08-07 NOTE — ED ADULT NURSE NOTE - OBJECTIVE STATEMENT
c/o testicular pain and swelling reports onset  this mornings states was seen in ER on Monday for fevers. denies any urinary complaints. endorses continued fevers and chills, hx of DM, HTN. high Cholesterol.  PT A&OX4.  No distress noted.  Denies Cp, no SOB noted.  Resp WDL.  Breathing non-labored.  PT denies pain at this time.  Skin intact.  PT ambulatory, able to move all extremities.  Will continue to monitor.

## 2022-08-07 NOTE — ED ADULT NURSE NOTE - NSIMPLEMENTINTERV_GEN_ALL_ED
Implemented All Universal Safety Interventions:  [place] to call system. Call bell, personal items and telephone within reach. Instruct patient to call for assistance. Room bathroom lighting operational. Non-slip footwear when patient is off stretcher. Physically safe environment: no spills, clutter or unnecessary equipment. Stretcher in lowest position, wheels locked, appropriate side rails in place.

## 2022-08-07 NOTE — ED PROVIDER NOTE - CLINICAL SUMMARY MEDICAL DECISION MAKING FREE TEXT BOX
Gayle: R testicle swelling and F x 5 days. On urgent care-prescribed Bactrim. Still w/ F. Check UA/Cx and US (? epididymitis/orchitis).

## 2022-08-07 NOTE — ED PROVIDER NOTE - PATIENT PORTAL LINK FT
You can access the FollowMyHealth Patient Portal offered by [facility] by registering at the following website: [email or web address]. By joining [employer]’s FollowMyHealth portal, you will also be able to view your health information using other applications (apps) compatible with our system.

## 2022-08-07 NOTE — ED PROVIDER NOTE - NSFOLLOWUPINSTRUCTIONS_ED_ALL_ED_FT
Please follow up with a Urologist within the next 4-6 days.    Medication has been sent to your pharmacy, please take as directed.     You may take Tylenol (500mg) every 6 hours or Ibuprofen (400mg) every 8 hours for pain control.     Please return to the emergency department if you experience any of the following symptoms:    Fever  Chest pain  Difficulty breathing  Abdominal pain  Nausea  Vomiting   Blood in urine  Pain with urination

## 2022-08-08 LAB
CULTURE RESULTS: SIGNIFICANT CHANGE UP
SPECIMEN SOURCE: SIGNIFICANT CHANGE UP

## 2022-08-11 ENCOUNTER — APPOINTMENT (OUTPATIENT)
Dept: CARDIOLOGY | Facility: CLINIC | Age: 72
End: 2022-08-11

## 2022-08-11 ENCOUNTER — NON-APPOINTMENT (OUTPATIENT)
Age: 72
End: 2022-08-11

## 2022-08-11 VITALS
BODY MASS INDEX: 28.34 KG/M2 | WEIGHT: 187 LBS | SYSTOLIC BLOOD PRESSURE: 174 MMHG | DIASTOLIC BLOOD PRESSURE: 86 MMHG | HEART RATE: 86 BPM | HEIGHT: 68 IN | TEMPERATURE: 98.4 F | OXYGEN SATURATION: 98 %

## 2022-08-11 PROCEDURE — 93000 ELECTROCARDIOGRAM COMPLETE: CPT

## 2022-08-11 PROCEDURE — 99215 OFFICE O/P EST HI 40 MIN: CPT

## 2022-08-11 NOTE — HISTORY OF PRESENT ILLNESS
[FreeTextEntry1] : 72M with CAD/PCI, DM, HTN presents for f/u\par PMD:	\par \par Previously,\par pt presented to [facility] 6/7/21 with CP, and elevated Morgan.found to have LVEF 45%, mLAD 30%, pRCA 70%, mRCA 99%. pt received 3.5 X 26 Resolute Rayo drug-eluting stent, and a 2.75 X 38 Resolute Rayo drug-eluting stent to the RCA. and recommended to cont DAPT for 12 months. \par A TTE in the hospital revealed a LVEF 60% with Normal left ventricular systolic function and No segmental wall motion abnormalities. Normal right ventricular size and function.\par Patient seen here in cardiology for an initial visit in 6/21 following his discharge. at that time pt was feeling well, without complaints, taking dapt without any issues. \par \par  2/22, pt with hematuria 2/2 nephrolithiasis, was planning on lithotripsy ([facility]par \par pt last seen in cardiology 3/22, at that time, pt feeling well overall, no CV symptoms.\par \par BP elevated on losartan 100 toprol 25. norvasc increased from 5 to 10. \par \par pt now presents for follow up. \par today,\par \par pt has not yet underwent the lithotripsy. \par pt feeling well overall, no cp sob, at rest or on exertion. no palpitaitons, dizziness syncope, le edema, orthopnea. \par \par \par pt states full compliance with bp meds, states he checks his bp a home, and it has been improved over the past few weeks. 130s systolic mostly. \par will come off plavix\par pt denies HA, blurry vision, cp, sob. \par \par \par pt states full compliance with all meds: toprol 25 losartan 100. norvasc 10. \par \par pt without complaints today. \par \par \par Exercise: walking 3-4 times a week, no symptoms\par Diet: none\par Prior cardiac workup: see above\par Recent labs:\par \par \par \par Med hx:\par Sx hx: none\par Fam hx: no known cardiac hx\par Social hx: lives in [place] with wife, works in [employer] (no heavy lifting) quit TOB 30 yrs ago. daily beer/liquor (2 beers or 2 shots), denies drugs\par Meds: asa plavix lipitor 80 toprol 25 losartan 100 metformin norvasc 10\par Allergies: nkda\par

## 2022-08-11 NOTE — DISCUSSION/SUMMARY
[FreeTextEntry1] : 72M with CAD/PCI, DM, HTN presents for f/u\par \par 1. CAD/PCI\par -cp resolved since pci\par -cont asa plavix for one year course\par -cont losartan, toprol \par \par 2. HTN\par -elevated in office today, but pt states it has been improved at home\par -cont losartan 100 toprol 25 norvasc 10\par \par \par \par f/u 4 months or sooner if required\par \par

## 2022-09-08 ENCOUNTER — APPOINTMENT (OUTPATIENT)
Dept: UROLOGY | Facility: CLINIC | Age: 72
End: 2022-09-08

## 2022-09-08 VITALS
TEMPERATURE: 98.2 F | SYSTOLIC BLOOD PRESSURE: 144 MMHG | BODY MASS INDEX: 28.79 KG/M2 | HEIGHT: 68 IN | HEART RATE: 72 BPM | DIASTOLIC BLOOD PRESSURE: 77 MMHG | WEIGHT: 190 LBS | RESPIRATION RATE: 15 BRPM

## 2022-09-08 DIAGNOSIS — Z78.9 OTHER SPECIFIED HEALTH STATUS: ICD-10-CM

## 2022-09-08 PROCEDURE — 99204 OFFICE O/P NEW MOD 45 MIN: CPT

## 2022-09-08 NOTE — HISTORY OF PRESENT ILLNESS
[FreeTextEntry1] : Chief Complaint: testicular pain.\par \par - Chief Complaint: The patient is a 72y Male complaining of testicular pain.\par - HPI Objective Statement: 72 year old male with a PMHx of HTN, DM, CAD with\par stent presenting with right testicular swelling. Patient has been having\par intermittent fevers since Monday. Patient noticed his right testicle was\par swollen on Wednesday. Evaluated by Urgent care on Wednesday and started on\par bactrim. Still having intermittent fevers. Denies pain in the right testicle,\par hematuria, dysuria, sob, chest pain, abdominal pain, nausea, vomiting.\par sono c/w right epididymoorchitis \par ua lots of rbc wbc but cx neg \par cr 1.66 \par took course abx feels better but swelling persists \par denies any luts\par not sexually active

## 2022-09-08 NOTE — ASSESSMENT
[FreeTextEntry1] : pvr 300 ml - does not want cath \par will sart tamsulosin \par check ua cx \par f/u scrotal sono repeat pvr

## 2022-09-08 NOTE — PHYSICAL EXAM
[General Appearance - Well Developed] : well developed [General Appearance - Well Nourished] : well nourished [Normal Appearance] : normal appearance [Well Groomed] : well groomed [General Appearance - In No Acute Distress] : no acute distress [Edema] : no peripheral edema [Respiration, Rhythm And Depth] : normal respiratory rhythm and effort [Exaggerated Use Of Accessory Muscles For Inspiration] : no accessory muscle use [Abdomen Soft] : soft [Abdomen Tenderness] : non-tender [Costovertebral Angle Tenderness] : no ~M costovertebral angle tenderness [Urethral Meatus] : meatus normal [Urinary Bladder Findings] : the bladder was normal on palpation [Scrotum] : the scrotum was normal [Testes Mass (___cm)] : there were no testicular masses [No Prostate Nodules] : no prostate nodules [FreeTextEntry1] : large smooth  [Normal Station and Gait] : the gait and station were normal for the patient's age [] : no rash [No Focal Deficits] : no focal deficits [Oriented To Time, Place, And Person] : oriented to person, place, and time [Affect] : the affect was normal [Mood] : the mood was normal [Not Anxious] : not anxious [No Palpable Adenopathy] : no palpable adenopathy

## 2022-09-15 ENCOUNTER — APPOINTMENT (OUTPATIENT)
Dept: UROLOGY | Facility: CLINIC | Age: 72
End: 2022-09-15

## 2022-09-15 VITALS
HEIGHT: 68 IN | BODY MASS INDEX: 28.79 KG/M2 | RESPIRATION RATE: 16 BRPM | HEART RATE: 75 BPM | SYSTOLIC BLOOD PRESSURE: 150 MMHG | TEMPERATURE: 97.7 F | DIASTOLIC BLOOD PRESSURE: 77 MMHG | WEIGHT: 190 LBS | OXYGEN SATURATION: 98 %

## 2022-09-15 DIAGNOSIS — N40.1 BENIGN PROSTATIC HYPERPLASIA WITH LOWER URINARY TRACT SYMPMS: ICD-10-CM

## 2022-09-15 DIAGNOSIS — N13.8 BENIGN PROSTATIC HYPERPLASIA WITH LOWER URINARY TRACT SYMPMS: ICD-10-CM

## 2022-09-15 PROCEDURE — 99214 OFFICE O/P EST MOD 30 MIN: CPT

## 2022-09-15 PROCEDURE — 76870 US EXAM SCROTUM: CPT

## 2022-09-16 LAB
APPEARANCE: ABNORMAL
BACTERIA: NEGATIVE
BILIRUBIN URINE: NEGATIVE
BLOOD URINE: ABNORMAL
COLOR: NORMAL
GLUCOSE QUALITATIVE U: NEGATIVE
HYALINE CASTS: 1 /LPF
KETONES URINE: NEGATIVE
LEUKOCYTE ESTERASE URINE: ABNORMAL
MICROSCOPIC-UA: NORMAL
NITRITE URINE: NEGATIVE
PH URINE: 6
PROTEIN URINE: NORMAL
RED BLOOD CELLS URINE: 12 /HPF
SPECIFIC GRAVITY URINE: 1.02
SQUAMOUS EPITHELIAL CELLS: 1 /HPF
UROBILINOGEN URINE: NORMAL
WHITE BLOOD CELLS URINE: 5 /HPF

## 2022-09-19 LAB — BACTERIA UR CULT: ABNORMAL

## 2022-09-19 RX ORDER — AMOXICILLIN 500 MG/1
500 TABLET, FILM COATED ORAL
Qty: 14 | Refills: 0 | Status: ACTIVE | COMMUNITY
Start: 2022-09-19 | End: 1900-01-01

## 2022-09-22 ENCOUNTER — NON-APPOINTMENT (OUTPATIENT)
Age: 72
End: 2022-09-22

## 2022-10-03 PROBLEM — N40.1 BPH WITH URINARY OBSTRUCTION: Status: ACTIVE | Noted: 2022-09-08

## 2022-10-03 NOTE — PHYSICAL EXAM
[General Appearance - Well Developed] : well developed [General Appearance - Well Nourished] : well nourished [Normal Appearance] : normal appearance [Well Groomed] : well groomed [General Appearance - In No Acute Distress] : no acute distress [Abdomen Soft] : soft [Abdomen Tenderness] : non-tender [Costovertebral Angle Tenderness] : no ~M costovertebral angle tenderness [Urethral Meatus] : meatus normal [Urinary Bladder Findings] : the bladder was normal on palpation [Testes Mass (___cm)] : there were no testicular masses [No Prostate Nodules] : no prostate nodules [FreeTextEntry1] : large smooth    large righthydrocelel [Edema] : no peripheral edema [] : no respiratory distress [Respiration, Rhythm And Depth] : normal respiratory rhythm and effort [Exaggerated Use Of Accessory Muscles For Inspiration] : no accessory muscle use [Oriented To Time, Place, And Person] : oriented to person, place, and time [Affect] : the affect was normal [Mood] : the mood was normal [Not Anxious] : not anxious [Normal Station and Gait] : the gait and station were normal for the patient's age [No Focal Deficits] : no focal deficits [No Palpable Adenopathy] : no palpable adenopathy

## 2022-10-03 NOTE — ASSESSMENT
[FreeTextEntry1] : pvr 100 ml\par cont tamsulosin\par \par large right hydrocele \par will observe  for now \par repeat ucx

## 2022-10-06 RX ORDER — TAMSULOSIN HYDROCHLORIDE 0.4 MG/1
0.4 CAPSULE ORAL
Qty: 90 | Refills: 0 | Status: ACTIVE | COMMUNITY
Start: 2022-09-08 | End: 1900-01-01

## 2022-10-27 ENCOUNTER — APPOINTMENT (OUTPATIENT)
Dept: UROLOGY | Facility: CLINIC | Age: 72
End: 2022-10-27

## 2022-10-27 ENCOUNTER — RX RENEWAL (OUTPATIENT)
Age: 72
End: 2022-10-27

## 2022-10-27 VITALS
DIASTOLIC BLOOD PRESSURE: 89 MMHG | HEART RATE: 70 BPM | SYSTOLIC BLOOD PRESSURE: 174 MMHG | TEMPERATURE: 98.3 F | BODY MASS INDEX: 29.4 KG/M2 | WEIGHT: 194 LBS | HEIGHT: 68 IN | OXYGEN SATURATION: 69 %

## 2022-10-27 DIAGNOSIS — N43.3 HYDROCELE, UNSPECIFIED: ICD-10-CM

## 2022-10-27 DIAGNOSIS — N45.1 EPIDIDYMITIS: ICD-10-CM

## 2022-10-27 PROCEDURE — 99213 OFFICE O/P EST LOW 20 MIN: CPT

## 2022-10-27 NOTE — PHYSICAL EXAM
[General Appearance - Well Developed] : well developed [General Appearance - Well Nourished] : well nourished [Normal Appearance] : normal appearance [Well Groomed] : well groomed [General Appearance - In No Acute Distress] : no acute distress [Abdomen Soft] : soft [Abdomen Tenderness] : non-tender [Costovertebral Angle Tenderness] : no ~M costovertebral angle tenderness [Urethral Meatus] : meatus normal [Urinary Bladder Findings] : the bladder was normal on palpation [Testes Mass (___cm)] : there were no testicular masses [No Prostate Nodules] : no prostate nodules [Edema] : no peripheral edema [] : no respiratory distress [Respiration, Rhythm And Depth] : normal respiratory rhythm and effort [Exaggerated Use Of Accessory Muscles For Inspiration] : no accessory muscle use [Oriented To Time, Place, And Person] : oriented to person, place, and time [Affect] : the affect was normal [Mood] : the mood was normal [Not Anxious] : not anxious [Normal Station and Gait] : the gait and station were normal for the patient's age [No Focal Deficits] : no focal deficits [No Palpable Adenopathy] : no palpable adenopathy [FreeTextEntry1] : large smooth    large righthydrocele slightly smaller

## 2022-10-27 NOTE — HISTORY OF PRESENT ILLNESS
[FreeTextEntry1] : Chief Complaint: testicular pain.\par \par - Chief Complaint: The patient is a 72y Male complaining of testicular pain.\par - HPI Objective Statement: 72 year old male with a PMHx of HTN, DM, CAD with\par stent presenting with right testicular swelling. Patient has been having\par intermittent fevers since Monday. Patient noticed his right testicle was\par swollen on Wednesday. Evaluated by Urgent care on Wednesday and started on\par bactrim. Still having intermittent fevers. Denies pain in the right testicle,\par hematuria, dysuria, sob, chest pain, abdominal pain, nausea, vomiting.\par sono c/w right epididymoorchitis \par ua lots of rbc wbc but cx neg \par cr 1.66 \par took course abx feels better but swelling persists \par denies any luts\par not sexually active\par \par repeat cx still some enterococcus\par completed abc\par swelling improved but present \par no dysuria

## 2022-10-28 ENCOUNTER — RX RENEWAL (OUTPATIENT)
Age: 72
End: 2022-10-28

## 2022-11-14 LAB — BACTERIA UR CULT: NORMAL

## 2023-01-18 ENCOUNTER — APPOINTMENT (OUTPATIENT)
Dept: CARDIOLOGY | Facility: CLINIC | Age: 73
End: 2023-01-18
Payer: COMMERCIAL

## 2023-01-18 ENCOUNTER — RX RENEWAL (OUTPATIENT)
Age: 73
End: 2023-01-18

## 2023-01-18 VITALS
BODY MASS INDEX: 29.4 KG/M2 | WEIGHT: 194 LBS | HEIGHT: 68 IN | TEMPERATURE: 97.9 F | DIASTOLIC BLOOD PRESSURE: 85 MMHG | OXYGEN SATURATION: 95 % | SYSTOLIC BLOOD PRESSURE: 185 MMHG | HEART RATE: 82 BPM

## 2023-01-18 DIAGNOSIS — E11.9 TYPE 2 DIABETES MELLITUS W/OUT COMPLICATIONS: ICD-10-CM

## 2023-01-18 PROCEDURE — 99215 OFFICE O/P EST HI 40 MIN: CPT

## 2023-01-18 RX ORDER — METOPROLOL SUCCINATE 25 MG/1
25 TABLET, EXTENDED RELEASE ORAL DAILY
Qty: 90 | Refills: 3 | Status: DISCONTINUED | COMMUNITY
Start: 2021-06-17 | End: 2023-01-18

## 2023-01-18 NOTE — DISCUSSION/SUMMARY
[FreeTextEntry1] : 72M with CAD/PCI, DM, HTN presents for f/u\par \par CAD/PCI\par -cp resolved since pci\par -cont asa statin\par -cont losartan, BB\par \par \par HTN\par -elevated today\par -pt without cp sob HA blurry vision\par -cont losartan 100 norvasc 10\par -will change toprol 25 to coreg 12.5 bid\par -cont home bp log\par -pt to call in 2-3 weeks to discuss BP readings\par \par \par f/u 2 months\par >40 mins spent on complete enclkuinter

## 2023-01-18 NOTE — HISTORY OF PRESENT ILLNESS
[FreeTextEntry1] : 72M with CAD/PCI, DM, HTN presents for f/u\par PMD:	\par \par Previously,\par pt presented to [facility] 6/7/21 with CP, and elevated Morgan.found to have LVEF 45%, mLAD 30%, pRCA 70%, mRCA 99%. pt received 3.5 X 26 Resolute Rayo drug-eluting stent, and a 2.75 X 38 Resolute Rayo drug-eluting stent to the RCA. and recommended to cont DAPT for 12 months. \par A TTE in the hospital revealed a LVEF 60% with Normal left ventricular systolic function and No segmental wall motion abnormalities. Normal right ventricular size and function.\par Patient seen here in cardiology for an initial visit in 6/21 following his discharge. at that time pt was feeling well, without complaints, taking dapt without any issues. \par 2/22, pt with hematuria 2/2 nephrolithiasis, was planning on lithotripsy (Dr [facility], [facility]par 3/22, at that time, pt feeling well overall, no CV symptoms. BP elevated on losartan 100 toprol 25. norvasc increased from 5 to 10. \par \par pt last seen in cardiology 8/22, feeling well, bp improved with better med compliance at home. \par \par pt now presents for follow up. \par today,\par \par pt feeling well overall, no cp sob, at rest or on exertion. no palpitations, dizziness syncope, le edema, orthopnea. \par \par pt states full compliance with bp meds, states he checks his bp a home, states it has been labile, 130-160s. \par \par bp today 180s systolic, pt states she took his meds this morning but was rushing to get here. pt denies HA, blurry vision.\par \par pt has not yet underwent the lithotripsy. states he is seeing a new urologist, now without hematuria, might not need a lithitripsy. \par \par \par \par Exercise: walking 3-4 times a week, no symptoms\par Diet: none\par Prior cardiac workup: see above\par Recent labs:\par \par \par \par Med hx: CAD/PCI, DM, HTN\par Sx hx: none\par Fam hx: no known cardiac hx\par Social hx: lives in [place] with wife, works in [employer] (no heavy lifting) quit TOB 30 yrs ago. daily beer/liquor (2 beers or 2 shots), denies drugs\par Meds: asa lipitor 80 toprol 25 losartan 100 metformin norvasc 10\par Allergies: nkda\par \par

## 2023-01-18 NOTE — PHYSICAL EXAM
[Well Developed] : well developed [Well Nourished] : well nourished [No Acute Distress] : no acute distress [Normal Venous Pressure] : normal venous pressure [Normal S1, S2] : normal S1, S2 [No Murmur] : no murmur [Clear Lung Fields] : clear lung fields [Good Air Entry] : good air entry [Soft] : abdomen soft [Non Tender] : non-tender [No Masses/organomegaly] : no masses/organomegaly [Normal Gait] : normal gait [No Edema] : no edema [Moves all extremities] : moves all extremities [No Focal Deficits] : no focal deficits [Alert and Oriented] : alert and oriented

## 2023-02-06 ENCOUNTER — RX RENEWAL (OUTPATIENT)
Age: 73
End: 2023-02-06

## 2023-02-06 RX ORDER — AMLODIPINE BESYLATE 5 MG/1
5 TABLET ORAL
Qty: 90 | Refills: 0 | Status: ACTIVE | COMMUNITY
Start: 2022-10-27 | End: 1900-01-01

## 2023-03-23 ENCOUNTER — NON-APPOINTMENT (OUTPATIENT)
Age: 73
End: 2023-03-23

## 2023-03-23 ENCOUNTER — APPOINTMENT (OUTPATIENT)
Dept: CARDIOLOGY | Facility: CLINIC | Age: 73
End: 2023-03-23
Payer: MEDICARE

## 2023-03-23 VITALS
DIASTOLIC BLOOD PRESSURE: 91 MMHG | TEMPERATURE: 98.4 F | SYSTOLIC BLOOD PRESSURE: 158 MMHG | WEIGHT: 194 LBS | OXYGEN SATURATION: 92 % | HEIGHT: 68 IN | HEART RATE: 77 BPM | BODY MASS INDEX: 29.4 KG/M2

## 2023-03-23 PROCEDURE — 93000 ELECTROCARDIOGRAM COMPLETE: CPT

## 2023-03-23 PROCEDURE — 99214 OFFICE O/P EST MOD 30 MIN: CPT

## 2023-03-23 NOTE — HISTORY OF PRESENT ILLNESS
[FreeTextEntry1] : 72M with CAD/PCI, DM, HTN presents for f/u\par PMD:	\par \par Previously,\par pt presented to [facility] 6/7/21 with CP, and elevated Morgan.found to have LVEF 45%, mLAD 30%, pRCA 70%, mRCA 99%. pt received 3.5 X 26 Resolute Rayo drug-eluting stent, and a 2.75 X 38 Resolute Rayo drug-eluting stent to the RCA. and recommended to cont DAPT for 12 months. TTE in the hospital revealed a LVEF 60% with Normal left ventricular systolic function and No segmental wall motion abnormalities. Normal right ventricular size and function.\par Patient seen here in cardiology for an initial visit in 6/21 following his discharge. at that time pt was feeling well, without complaints, taking dapt without any issues. \par 2/22, pt with hematuria 2/2 nephrolithiasis, was planning on lithotripsy (Dr [facility], [facility]par 3/22, at that time, pt feeling well overall, no CV symptoms. BP elevated on losartan 100 toprol 25. norvasc increased from 5 to 10. \par 8/22, feeling well, bp improved with better med compliance at home. \par pt last seen 1/23, feeling well. bp elevated. pt on losartan 100, norvasc 10 and toprol changed to coreg 12.5. \par \par pt now presents for follow up. \par today,\par \par pt feeling well overall, no cp sob, at rest or on exertion. no palpitations, dizziness syncope, le edema, orthopnea. \par \par pt states full compliance with bp meds, states he checks his bp a home, still up to 130-160s at times. \par \par \par Exercise: walking 3-4 times a week, no symptoms\par Diet: none\par Prior cardiac workup: see above\par Recent labs:\par \par \par \par Med hx: CAD/PCI, DM, HTN\par Sx hx: none\par Fam hx: no known cardiac hx\par Social hx: lives in [place] with wife, works in [employer] (no heavy lifting) quit TOB 30 yrs ago. daily beer/liquor (2 beers or 2 shots), denies drugs\par Meds: asa lipitor 80 coreg 12.5 losartan 100 metformin norvasc 10\par Allergies: nkda\par

## 2023-03-23 NOTE — PHYSICAL EXAM
[Well Developed] : well developed [Well Nourished] : well nourished [No Acute Distress] : no acute distress [Normal Venous Pressure] : normal venous pressure [Normal S1, S2] : normal S1, S2 [No Murmur] : no murmur [Clear Lung Fields] : clear lung fields [Good Air Entry] : good air entry [No Respiratory Distress] : no respiratory distress  [Soft] : abdomen soft [Non Tender] : non-tender [No Masses/organomegaly] : no masses/organomegaly [Normal Gait] : normal gait [No Edema] : no edema [Moves all extremities] : moves all extremities [No Focal Deficits] : no focal deficits [Alert and Oriented] : alert and oriented

## 2023-03-23 NOTE — DISCUSSION/SUMMARY
[FreeTextEntry1] : 72M with CAD/PCI, DM, HTN presents for f/u\par \par CAD/PCI\par -cp resolved since pci\par -cont asa statin\par -cont losartan, BB\par -will check tte to eval LV EF\par \par HTN\par -elevated today\par -pt without cp sob HA blurry vision\par -cont losartan 100 norvasc 10\par -will increase coreg from 12.5 bid to 25 bid\par -cont home bp log\par \par \par \par f/u 3 months\par >40 min spent on complete encounter [EKG obtained to assist in diagnosis and management of assessed problem(s)] : EKG obtained to assist in diagnosis and management of assessed problem(s)

## 2023-03-30 ENCOUNTER — APPOINTMENT (OUTPATIENT)
Dept: CARDIOLOGY | Facility: CLINIC | Age: 73
End: 2023-03-30
Payer: MEDICARE

## 2023-03-30 PROCEDURE — 93306 TTE W/DOPPLER COMPLETE: CPT

## 2023-06-29 ENCOUNTER — APPOINTMENT (OUTPATIENT)
Dept: CARDIOLOGY | Facility: CLINIC | Age: 73
End: 2023-06-29
Payer: MEDICARE

## 2023-06-29 VITALS
SYSTOLIC BLOOD PRESSURE: 189 MMHG | OXYGEN SATURATION: 95 % | WEIGHT: 198 LBS | BODY MASS INDEX: 30.01 KG/M2 | DIASTOLIC BLOOD PRESSURE: 96 MMHG | TEMPERATURE: 98 F | HEIGHT: 68 IN | HEART RATE: 66 BPM

## 2023-06-29 DIAGNOSIS — I25.10 ATHEROSCLEROTIC HEART DISEASE OF NATIVE CORONARY ARTERY W/OUT ANGINA PECTORIS: ICD-10-CM

## 2023-06-29 DIAGNOSIS — I10 ESSENTIAL (PRIMARY) HYPERTENSION: ICD-10-CM

## 2023-06-29 DIAGNOSIS — Z98.61 ATHEROSCLEROTIC HEART DISEASE OF NATIVE CORONARY ARTERY W/OUT ANGINA PECTORIS: ICD-10-CM

## 2023-06-29 PROCEDURE — 99215 OFFICE O/P EST HI 40 MIN: CPT

## 2023-06-29 RX ORDER — CARVEDILOL 25 MG/1
25 TABLET, FILM COATED ORAL
Qty: 180 | Refills: 3 | Status: ACTIVE | COMMUNITY
Start: 2023-01-18 | End: 1900-01-01

## 2023-06-29 RX ORDER — LOSARTAN POTASSIUM 100 MG/1
100 TABLET, FILM COATED ORAL
Qty: 90 | Refills: 0 | Status: ACTIVE | COMMUNITY
Start: 2021-06-17 | End: 1900-01-01

## 2023-06-29 RX ORDER — AMLODIPINE BESYLATE 10 MG/1
10 TABLET ORAL DAILY
Qty: 90 | Refills: 1 | Status: ACTIVE | COMMUNITY
Start: 2021-10-28 | End: 1900-01-01

## 2023-06-29 NOTE — DISCUSSION/SUMMARY
[FreeTextEntry1] : 73M with CAD/PCI, DM, HTN presents for f/u\par \par CAD/PCI\par -cp resolved since pci\par -cont asa statin\par -cont losartan, BB\par \par HTN\par -elevated today\par -pt not interested in going to ER or making any med changes today\par -pt without cp sob HA blurry vision\par -cont losartan 100 norvasc 10 coreg to 25 bid\par -cont home bp log\par \par f/u 2 months, if bp still elevated will consider uptitrating regimen \par >40 min spent on complete encounter

## 2023-08-31 ENCOUNTER — APPOINTMENT (OUTPATIENT)
Dept: CARDIOLOGY | Facility: CLINIC | Age: 73
End: 2023-08-31

## 2023-11-10 NOTE — HISTORY OF PRESENT ILLNESS
[FreeTextEntry1] : 73M with CAD/PCI, DM, HTN presents for f/u\par PMD:	\par \par Previously,\par pt presented to [facility] 6/7/21 with CP, and elevated Morgan.found to have LVEF 45%, mLAD 30%, pRCA 70%, mRCA 99%. pt received 3.5 X 26 Resolute Rayo drug-eluting stent, and a 2.75 X 38 Resolute Rayo drug-eluting stent to the RCA. and recommended to cont DAPT for 12 months. TTE in the hospital revealed a LVEF 60% with Normal left ventricular systolic function and No segmental wall motion abnormalities. Normal right ventricular size and function.\par Patient seen here in cardiology for an initial visit in 6/21 following his discharge. at that time pt was feeling well, without complaints, taking dapt without any issues. \par 2/22, pt with hematuria 2/2 nephrolithiasis, was planning on lithotripsy (Dr Martin [facility], [facility]par 3/22, at that time, pt feeling well overall, no CV symptoms. BP elevated on losartan 100 toprol 25. norvasc increased from 5 to 10. \par 8/22, feeling well, bp improved with better med compliance at home. \par 1/23, feeling well. bp elevated. pt on losartan 100, norvasc 10 and toprol changed to coreg 12.5. \par pt last seen 3/23, feeling well. bp elevated at times up to 160s. coreg increased to 25. also on losartan 100 and norvasc 10\par \par pt now presents for follow up. \par today,\par \par pt feeling well. denies cp sob palpitations dizziness syncope.\par pt denies ha or blurry vision\par pt states improved compliance with meds, states he did not yet take them today, unclear why.\par \par pt log from home showing readings up to 160s systolic on occasion.\par pt states he needs refils on all meds.  pt not interested in taking any additional meds at this time. \par \par Exercise: walking 3-4 times a week, no symptoms\par Diet: none\par Prior cardiac workup: see above\par Recent labs:\par \par \par \par Med hx: CAD/PCI, DM, HTN\par Sx hx: none\par Fam hx: no known cardiac hx\par Social hx: lives in [place] with wife, works in [employer] (no heavy lifting) quit TOB 30 yrs ago. daily beer/liquor (2 beers or 2 shots), denies drugs\par Meds: asa lipitor 80 coreg 12.5 losartan 100 metformin norvasc 10\par Allergies: nkda\par 
None known